# Patient Record
Sex: FEMALE | Race: BLACK OR AFRICAN AMERICAN | Employment: FULL TIME | URBAN - METROPOLITAN AREA
[De-identification: names, ages, dates, MRNs, and addresses within clinical notes are randomized per-mention and may not be internally consistent; named-entity substitution may affect disease eponyms.]

---

## 2017-03-27 ENCOUNTER — APPOINTMENT (OUTPATIENT)
Dept: GENERAL RADIOLOGY | Age: 62
End: 2017-03-27
Attending: EMERGENCY MEDICINE
Payer: COMMERCIAL

## 2017-03-27 ENCOUNTER — HOSPITAL ENCOUNTER (EMERGENCY)
Age: 62
Discharge: HOME | End: 2017-03-27
Attending: EMERGENCY MEDICINE | Admitting: EMERGENCY MEDICINE
Payer: COMMERCIAL

## 2017-03-27 VITALS
WEIGHT: 233 LBS | OXYGEN SATURATION: 97 % | TEMPERATURE: 98.3 F | DIASTOLIC BLOOD PRESSURE: 78 MMHG | HEART RATE: 70 BPM | RESPIRATION RATE: 18 BRPM | HEIGHT: 67 IN | SYSTOLIC BLOOD PRESSURE: 166 MMHG | BODY MASS INDEX: 36.57 KG/M2

## 2017-03-27 DIAGNOSIS — M54.10 RADICULOPATHY AFFECTING UPPER EXTREMITY: Primary | ICD-10-CM

## 2017-03-27 PROCEDURE — 99282 EMERGENCY DEPT VISIT SF MDM: CPT

## 2017-03-27 PROCEDURE — 73030 X-RAY EXAM OF SHOULDER: CPT

## 2017-03-27 RX ORDER — OXYCODONE AND ACETAMINOPHEN 5; 325 MG/1; MG/1
TABLET ORAL
Qty: 12 TAB | Refills: 0 | Status: SHIPPED | OUTPATIENT
Start: 2017-03-27

## 2017-03-27 RX ORDER — CYCLOBENZAPRINE HCL 10 MG
10 TABLET ORAL
Qty: 15 TAB | Refills: 0 | Status: SHIPPED | OUTPATIENT
Start: 2017-03-27

## 2017-03-27 NOTE — DISCHARGE INSTRUCTIONS
Pinched Nerve in the Neck: Care Instructions  Your Care Instructions  A pinched nerve in the neck happens when a vertebra or disc in the upper part of your spine is damaged. This damage can happen because of an injury. Or it can just happen with age. The changes caused by the damage may put pressure on a nearby nerve root, pinching it. This causes symptoms such as sharp pain in your neck, shoulder, arm, or back. You may also have tingling or numbness. Sometimes it makes your arm weaker. The symptoms are usually worse when you turn your head or strain your neck. For many people, the symptoms get better over time and finally go away. Early treatment usually includes medicines for pain and swelling. Sometimes physical therapy and special exercises may help. Follow-up care is a key part of your treatment and safety. Be sure to make and go to all appointments, and call your doctor if you are having problems. It's also a good idea to know your test results and keep a list of the medicines you take. How can you care for yourself at home? · Be safe with medicines. Read and follow all instructions on the label. ¨ If the doctor gave you a prescription medicine for pain, take it as prescribed. ¨ If you are not taking a prescription pain medicine, ask your doctor if you can take an over-the-counter medicine. · Try using a heating pad on a low or medium setting for 15 to 20 minutes every 2 or 3 hours. Try a warm shower in place of one session with the heating pad. You can also buy single-use heat wraps that last up to 8 hours. · You can also try an ice pack for 10 to 15 minutes every 2 to 3 hours. There isn't strong evidence that either heat or ice will help. But you can try them to see if they help you. · Don't spend too long in one position. Take short breaks to move around and change positions. · Wear a seat belt and shoulder harness when you are in a car.   · Sleep with a pillow under your head and neck that keeps your neck straight. · If you were given a neck brace (cervical collar) to limit neck motion, wear it as instructed for as many days as your doctor tells you to. Do not wear it longer than you were told to. Wearing a brace for too long can lead to neck stiffness and can weaken the neck muscles. · Follow your doctor's instructions for gentle neck-stretching exercises. · Do not smoke. Smoking can slow healing of your discs. If you need help quitting, talk to your doctor about stop-smoking programs and medicines. These can increase your chances of quitting for good. · Avoid strenuous work or exercise until your doctor says it is okay. When should you call for help? Call 911 anytime you think you may need emergency care. For example, call if:  · You are unable to move an arm or a leg at all. Call your doctor now or seek immediate medical care if:  · You have new or worse symptoms in your arms, legs, chest, belly, or buttocks. Symptoms may include:  ¨ Numbness or tingling. ¨ Weakness. ¨ Pain. · You lose bladder or bowel control. Watch closely for changes in your health, and be sure to contact your doctor if:  · You are not getting better as expected. Where can you learn more? Go to http://luis m-pedro.info/. Enter W379 in the search box to learn more about \"Pinched Nerve in the Neck: Care Instructions. \"  Current as of: May 23, 2016  Content Version: 11.1  © 7005-6417 [employer]. Care instructions adapted under license by [employer] (which disclaims liability or warranty for this information). If you have questions about a medical condition or this instruction, always ask your healthcare professional. [place] [address] any warranty or liability for your use of this information.

## 2017-03-27 NOTE — LETTER
[facility] EMERGENCY DEPT 
[address] [facility] [address] 
[phone] Work/School Note Date: 3/27/2017 To Whom It May concern: 
 
Kiana Irizarry was seen and treated today in the emergency room by the following provider(s): 
Attending Provider: Raul Quiroga MD.   
 
Doris North may return to work on 03/30/2017 Mckenzie Curran Sincerely, 
 
 
 
 
Raul Quiroga MD

## 2017-03-27 NOTE — ED PROVIDER NOTES
HPI Comments: 5:39 PM Meg Reyes is a 64 y.o. Female with a history of DM, heart murmur, and cervical fusion  presenting to the ED with R shoulder pain that began 4 days ago. SHe denies any recent injury or trauma, she was at a basketball game when the pain began. She states the pain is worse with movement and it radiates down the R arm to the finger tips causing a tingling in her finger tips. Pt denies neck pain. No other complaints at this time. Patient is a 64 y.o. female presenting with shoulder pain. The history is provided by the patient. Shoulder Pain           Past Medical History:   Diagnosis Date    Anemia     Arthritis     knees back     Arthritis     Bronchitis     Chest tightness     Chronic fatigue syndrome     Chronic pain     left knee    Colon polyps     Depression     Diabetes mellitus (HCC)     Elevated cholesterol     Heart disease     Heart murmur     Heart murmur     History of echocardiogram 04/17/2012    EF 60-65%. Mild conc LVH. RVSP 15-20. No significant valvular heart disease.     Hypercholesterolemia     Left knee pain     Lumbar radicular pain     Microscopic hematuria     Migraine headache     Milk intolerance     Palpitations     Renal cyst     YEE (stress urinary incontinence), male     Swelling     Syncope     Vitamin D deficiency        Past Surgical History:   Procedure Laterality Date    HX COLONOSCOPY  2/18/2014    Polyp (5mm) in the proximal sigmoid colon. (polypectomy)    HX COLONOSCOPY  04/07/2009    HX COLONOSCOPY  4/10/2006    HX HYSTERECTOMY  1989    HX KNEE ARTHROSCOPY      HX KNEE ARTHROSCOPY      HX KNEE REPLACEMENT  03/2013    left knee replacement    HX MENISCECTOMY      HX ORTHOPAEDIC      neck fusion  c4 c5    HX ORTHOPAEDIC      arthroscopy left knee    HX OTHER SURGICAL  2009    Colon polyp removed         Family History:   Problem Relation Age of Onset    Diabetes Mother     Heart Surgery Father    Nolia Stamp Cancer Father     Hypertension Sister     Diabetes Brother     High Cholesterol Sister     Diabetes Sister     Breast Cancer Sister     HIV/AIDS Brother     Breast Cancer Other     Ovarian Cancer Maternal Aunt        Social History     Social History    Marital status:      Spouse name: N/A    Number of children: N/A    Years of education: N/A     Occupational History    Not on file. Social History Main Topics    Smoking status: Former Smoker     Types: Cigarettes    Smokeless tobacco: Never Used      Comment: Quit in 1992, smoked 1 pack a week    Alcohol use No    Drug use: No    Sexual activity: Not Currently     Other Topics Concern    Not on file     Social History Narrative         ALLERGIES: Calcium iodide; Pneumovax 23 [pneumococcal 23-alisha ps vaccine]; and Shellfish containing products    Review of Systems   Constitutional: Negative for chills and fever. HENT: Negative for congestion and sneezing. Eyes: Negative for visual disturbance. Respiratory: Negative for cough and shortness of breath. Cardiovascular: Negative for chest pain. Gastrointestinal: Negative for abdominal pain, nausea and vomiting. Genitourinary: Negative for difficulty urinating and dysuria. Musculoskeletal: Positive for arthralgias (R shoulder pain). Negative for back pain. Skin: Negative for rash. Neurological: Negative for weakness and headaches. Vitals:    03/27/17 1713   BP: 166/78   Pulse: 70   Resp: 18   Temp: 98.3 °F (36.8 °C)   SpO2: 97%   Weight: 105.7 kg (233 lb)   Height: 5' 7\" (1.702 m)            Physical Exam   Constitutional: She is oriented to person, place, and time. She appears well-developed and well-nourished. No distress. HENT:   Head: Normocephalic and atraumatic.    Right Ear: External ear normal.   Left Ear: External ear normal.   Mouth/Throat: Oropharynx is clear and moist.   Eyes: Conjunctivae and EOM are normal. Pupils are equal, round, and reactive to light.   Neck: Normal range of motion. Neck supple. No JVD present. No tracheal deviation present. No thyromegaly present. Cardiovascular: Normal rate, regular rhythm and normal heart sounds. Exam reveals no gallop and no friction rub. No murmur heard. Pulmonary/Chest: Effort normal and breath sounds normal. No stridor. No respiratory distress. She has no wheezes. She has no rales. She exhibits no tenderness. Abdominal: Soft. Bowel sounds are normal. She exhibits no distension. There is no tenderness. There is no rebound. Musculoskeletal: Normal range of motion. She exhibits tenderness. She exhibits no edema. Right shoulder: She exhibits tenderness. She exhibits no swelling and no effusion. Mild TTP over the anterior shoulder area no joint effusion no swelling or skin changes    Lymphadenopathy:     She has no cervical adenopathy. Neurological: She is alert and oriented to person, place, and time. No cranial nerve deficit. Skin: Skin is warm. She is not diaphoretic. Psychiatric: She has a normal mood and affect. Her behavior is normal. Judgment and thought content normal.   Nursing note and vitals reviewed. MDM  Number of Diagnoses or Management Options  Radiculopathy affecting upper extremity:   Diagnosis management comments: Results reviewed with pt, she agrees with dispo and F/U plan. Dex Colorado MD  6:01 PM         Amount and/or Complexity of Data Reviewed  Tests in the radiology section of CPT®: ordered and reviewed      ED Course       Procedures  Vitals:  Patient Vitals for the past 12 hrs:   Temp Pulse Resp BP SpO2   03/27/17 1713 98.3 °F (36.8 °C) 70 18 166/78 97 %   [employer] interpreted within normal limits.        Medications ordered:   Medications - No data to display        X-Ray, CT or other radiology findings or impressions:  XR SHOULDER RT AP/LAT MIN 2 V    (Results Pending)       Progress notes, Consult notes or additional Procedure notes:   6:00 PM Pt reevaluated at this time and is resting comfortably in NAD. Discussed results and findings, as well as, diagnosis and plan for discharge. Pt verbalizes understanding and agreement with plan. All questions addressed at this time. Disposition:  Diagnosis:   1. Radiculopathy affecting upper extremity        Disposition: discharged    Follow-up Information     Follow up With Details [address], [place] In 1 week  [address] [facility] [address] [facility] [address] [facility]      [address] [facility] EMERGENCY DEPT  As needed, If symptoms worsen [address] [address]  [phone]           Patient's Medications   Start Taking    OXYCODONE-ACETAMINOPHEN (PERCOCET) 5-325 MG PER TABLET    Take 1 tablet every 4-6 hours as needed for pain control. If you were instructed to try over the counter ibuprofen or tylenol, only take the percocet for pain not controlled with the over the counter medication. Continue Taking    ASPIRIN 81 MG CHEWABLE TABLET    Take 81 mg by mouth daily. ATORVASTATIN (LIPITOR) 40 MG TABLET    Take  by mouth daily. CHOLECALCIFEROL, VITAMIN D3, 2,000 UNIT TAB    Take  by mouth. CYANOCOBALAMIN (VITAMIN B12) 500 MCG TABLET    Take 500 mcg by mouth daily. ESOMEPRAZOLE (NEXIUM) 40 MG CAPSULE        GABAPENTIN (NEURONTIN) 300 MG CAPSULE    Take 300 mg by mouth three (3) times daily. LOSARTAN (COZAAR) 100 MG TABLET    Take 100 mg by mouth daily. METOCLOPRAMIDE HCL (REGLAN) 5 MG TABLET    Take 5 mg by mouth Before breakfast, lunch, and dinner. MOMETASONE (NASONEX) 50 MCG/ACTUATION NASAL SPRAY    2 Sprays daily. MONTELUKAST (SINGULAIR) 10 MG TABLET    Take 10 mg by mouth daily. PAROXETINE (PAXIL) 20 MG TABLET    Take  by mouth daily. TOPIRAMATE (TOPAMAX) 50 MG TABLET    Take  by mouth two (2) times a day. VITAMIN E (E GEMS) 1,000 UNIT CAPSULE    Take 1,000 Units by mouth daily.    These Medications have changed    No medications on file   Stop Taking    DIAZEPAM (VALIUM) 2 MG TABLET    Take 2.5 Tabs by mouth two (2) times daily as needed for Anxiety. Max Daily Amount: 10 mg. DICLOFENAC EC (VOLTAREN) 75 MG EC TABLET    Take 1 Tab by mouth two (2) times a day. NEBIVOLOL (BYSTOLIC) 5 MG TABLET    Take  by mouth daily. SITAGLIPTIN (JANUVIA) 100 MG TABLET    Take 100 mg by mouth daily. SCRIBE ATTESTATION STATEMENT  Documented by: Chance Yan scribing for, and in the presence of, Tamia Grewal MD 5:44 PM     PROVIDER ATTESTATION STATEMENT  I personally performed the services described in the documentation, reviewed the documentation, as recorded by the scribe in my presence, and it accurately and completely records my words and actions.   Tamia Grewal MD

## 2017-03-27 NOTE — ED TRIAGE NOTES
Patient states:  \"I think I have a pinched nerve to my right shoulder\". Patient states pain to right shoulder radiating downward into arm x 3 days. Denies any known injury.

## 2017-04-05 ENCOUNTER — OFFICE VISIT (OUTPATIENT)
Dept: ORTHOPEDIC SURGERY | Age: 62
End: 2017-04-05

## 2017-04-05 VITALS
SYSTOLIC BLOOD PRESSURE: 164 MMHG | HEART RATE: 87 BPM | RESPIRATION RATE: 18 BRPM | BODY MASS INDEX: 36.26 KG/M2 | HEIGHT: 67 IN | TEMPERATURE: 98.2 F | DIASTOLIC BLOOD PRESSURE: 80 MMHG | WEIGHT: 231 LBS | OXYGEN SATURATION: 97 %

## 2017-04-05 DIAGNOSIS — M79.18 MYOFASCIAL PAIN: ICD-10-CM

## 2017-04-05 DIAGNOSIS — M54.2 NECK PAIN: ICD-10-CM

## 2017-04-05 DIAGNOSIS — M54.12 CERVICAL RADICULOPATHY: Primary | ICD-10-CM

## 2017-04-05 NOTE — LETTER
4/5/2017 3:54 PM 
 
Ms. Anson LONGORIA Blanco [address] [place] [id number] To Whom It May Concern: 
 
Kiana Irizarry is currently under the care of [address] [place] [address]. She was seen today on 4/5/2017. Ms. Tamara Rudolph will stay out of work for the next two weeks. Her condition will be reevaluated after this period. If there are questions or concerns please have the patient contact our office.  
 
 
 
Sincerely, 
 
 
Anna Wolfe MD

## 2017-04-05 NOTE — PATIENT INSTRUCTIONS
Pinched Nerve in the Neck: Care Instructions  Your Care Instructions  A pinched nerve in the neck happens when a vertebra or disc in the upper part of your spine is damaged. This damage can happen because of an injury. Or it can just happen with age. The changes caused by the damage may put pressure on a nearby nerve root, pinching it. This causes symptoms such as sharp pain in your neck, shoulder, arm, or back. You may also have tingling or numbness. Sometimes it makes your arm weaker. The symptoms are usually worse when you turn your head or strain your neck. For many people, the symptoms get better over time and finally go away. Early treatment usually includes medicines for pain and swelling. Sometimes physical therapy and special exercises may help. Follow-up care is a key part of your treatment and safety. Be sure to make and go to all appointments, and call your doctor if you are having problems. It's also a good idea to know your test results and keep a list of the medicines you take. How can you care for yourself at home? · Be safe with medicines. Read and follow all instructions on the label. ¨ If the doctor gave you a prescription medicine for pain, take it as prescribed. ¨ If you are not taking a prescription pain medicine, ask your doctor if you can take an over-the-counter medicine. · Try using a heating pad on a low or medium setting for 15 to 20 minutes every 2 or 3 hours. Try a warm shower in place of one session with the heating pad. You can also buy single-use heat wraps that last up to 8 hours. · You can also try an ice pack for 10 to 15 minutes every 2 to 3 hours. There isn't strong evidence that either heat or ice will help. But you can try them to see if they help you. · Don't spend too long in one position. Take short breaks to move around and change positions. · Wear a seat belt and shoulder harness when you are in a car.   · Sleep with a pillow under your head and neck that keeps your neck straight. · If you were given a neck brace (cervical collar) to limit neck motion, wear it as instructed for as many days as your doctor tells you to. Do not wear it longer than you were told to. Wearing a brace for too long can lead to neck stiffness and can weaken the neck muscles. · Follow your doctor's instructions for gentle neck-stretching exercises. · Do not smoke. Smoking can slow healing of your discs. If you need help quitting, talk to your doctor about stop-smoking programs and medicines. These can increase your chances of quitting for good. · Avoid strenuous work or exercise until your doctor says it is okay. When should you call for help? Call 911 anytime you think you may need emergency care. For example, call if:  · You are unable to move an arm or a leg at all. Call your doctor now or seek immediate medical care if:  · You have new or worse symptoms in your arms, legs, chest, belly, or buttocks. Symptoms may include:  ¨ Numbness or tingling. ¨ Weakness. ¨ Pain. · You lose bladder or bowel control. Watch closely for changes in your health, and be sure to contact your doctor if:  · You are not getting better as expected. Where can you learn more? Go to http://luis m-pedro.info/. Enter U412 in the search box to learn more about \"Pinched Nerve in the Neck: Care Instructions. \"  Current as of: May 23, 2016  Content Version: 11.2  © 4028-0812 [employer]. Care instructions adapted under license by [employer] (which disclaims liability or warranty for this information). If you have questions about a medical condition or this instruction, always ask your healthcare professional. [place] [address] any warranty or liability for your use of this information.

## 2017-04-05 NOTE — MR AVS SNAPSHOT
Visit Information Date & Time Provider Department Dept. Phone Encounter #  
 4/5/2017  2:45 PM Eduardo Vidal MD [facility] [phone] [id number] Your Appointments 4/28/2017 10:30 AM  
Follow Up with Eduardo Vidal MD  
[address] [facility] [facility]) Appt Note: mri fu  
 [address] [facility] [address] [phone]  
  
   
 [address] [facility] [address] Upcoming Health Maintenance Date Due Hepatitis C Screening 1955 DTaP/Tdap/Td series (1 - Tdap) 7/19/1976 PAP AKA CERVICAL CYTOLOGY 7/19/1976 FOBT Q 1 YEAR AGE 50-75 7/19/2005 ZOSTER VACCINE AGE 60> 7/19/2015 INFLUENZA AGE 9 TO ADULT 8/1/2016 BREAST CANCER SCRN MAMMOGRAM 10/27/2016 Allergies as of 4/5/2017  Review Complete On: 4/5/2017 By: Humaira Red Severity Noted Reaction Type Reactions Calcium Iodide  03/29/2016    Unproven on Challenge Pt. denies Pneumovax 23 [Pneumococcal 23-alisha Ps Vaccine]    Unknown (comments) Shellfish Containing Products  04/06/2012    Swelling Current Immunizations  Never Reviewed Name Date Pneumococcal Polysaccharide (PPSV-23) 3/13/2013  5:42 PM  
  
 Not reviewed this visit You Were Diagnosed With   
  
 Codes Comments Cervical radiculopathy    -  Primary ICD-10-CM: M54.12 
ICD-9-CM: 723.4 Myofascial pain     ICD-10-CM: M79.1 ICD-9-CM: 729.1 Neck pain     ICD-10-CM: M54.2 ICD-9-CM: 723.1 Vitals BP Pulse Temp Resp Height(growth percentile) Weight(growth percentile) 164/80 87 98.2 °F (36.8 °C) (Oral) 18 5' 7\" (1.702 m) 231 lb (104.8 kg) SpO2 BMI OB Status Smoking Status 97% 36.18 kg/m2 Postmenopausal Former Smoker BMI and BSA Data Body Mass Index Body Surface Area  
 36.18 kg/m 2 2.23 m 2 Preferred Pharmacy Pharmacy Name Phone Imelda Beasleyjhon [address] [facility] [place] [address], [address] [address] [phone] Your Updated Medication List  
  
   
This list is accurate as of: 4/5/17  4:05 PM.  Always use your most recent med list.  
  
  
  
  
 aspirin 81 mg chewable tablet Take 81 mg by mouth daily. atorvastatin 40 mg tablet Commonly known as:  LIPITOR Take  by mouth daily. cholecalciferol (vitamin D3) 2,000 unit Tab Take  by mouth.  
  
 cyanocobalamin 500 mcg tablet Commonly known as:  VITAMIN B12 Take 500 mcg by mouth daily. cyclobenzaprine 10 mg tablet Commonly known as:  FLEXERIL Take 1 Tab by mouth three (3) times daily as needed for Muscle Spasm(s). esomeprazole 40 mg capsule Commonly known as:  NEXIUM  
  
 gabapentin 300 mg capsule Commonly known as:  NEURONTIN Take 300 mg by mouth three (3) times daily. losartan 100 mg tablet Commonly known as:  COZAAR Take 100 mg by mouth daily. metoclopramide HCl 5 mg tablet Commonly known as:  REGLAN Take 5 mg by mouth Before breakfast, lunch, and dinner. montelukast 10 mg tablet Commonly known as:  SINGULAIR Take 10 mg by mouth daily. NASONEX 50 mcg/actuation nasal spray Generic drug:  mometasone 2 Sprays daily. oxyCODONE-acetaminophen 5-325 mg per tablet Commonly known as:  PERCOCET Take 1 tablet every 4-6 hours as needed for pain control. If you were instructed to try over the counter ibuprofen or tylenol, only take the percocet for pain not controlled with the over the counter medication. PAXIL 20 mg tablet Generic drug:  PARoxetine Take  by mouth daily. TOPAMAX 50 mg tablet Generic drug:  topiramate Take  by mouth two (2) times a day. vitamin e 1,000 unit capsule Commonly known as:  E GEMS Take 1,000 Units by mouth daily. We Performed the Following AMB POC XRAY, SPINE, CERVICAL; 2 OR 3 [64266 CPT(R)] To-Do List   
 04/12/2017 Imaging:  MRI CERV SPINE WO CONT Referral Information Referral ID Referred By Referred To  
  
 [id number] Shabana Cao Not Available Visits Status Start Date End Date 1 New Request 4/5/17 4/5/18 If your referral has a status of pending review or denied, additional information will be sent to support the outcome of this decision. Patient Instructions Pinched Nerve in the Neck: Care Instructions Your Care Instructions A pinched nerve in the neck happens when a vertebra or disc in the upper part of your spine is damaged. This damage can happen because of an injury. Or it can just happen with age. The changes caused by the damage may put pressure on a nearby nerve root, pinching it. This causes symptoms such as sharp pain in your neck, shoulder, arm, or back. You may also have tingling or numbness. Sometimes it makes your arm weaker. The symptoms are usually worse when you turn your head or strain your neck. For many people, the symptoms get better over time and finally go away. Early treatment usually includes medicines for pain and swelling. Sometimes physical therapy and special exercises may help. Follow-up care is a key part of your treatment and safety. Be sure to make and go to all appointments, and call your doctor if you are having problems. It's also a good idea to know your test results and keep a list of the medicines you take. How can you care for yourself at home? · Be safe with medicines. Read and follow all instructions on the label. ¨ If the doctor gave you a prescription medicine for pain, take it as prescribed. ¨ If you are not taking a prescription pain medicine, ask your doctor if you can take an over-the-counter medicine. · Try using a heating pad on a low or medium setting for 15 to 20 minutes every 2 or 3 hours. Try a warm shower in place of one session with the heating pad. You can also buy single-use heat wraps that last up to 8 hours. · You can also try an ice pack for 10 to 15 minutes every 2 to 3 hours. There isn't strong evidence that either heat or ice will help. But you can try them to see if they help you. · Don't spend too long in one position. Take short breaks to move around and change positions. · Wear a seat belt and shoulder harness when you are in a car. · Sleep with a pillow under your head and neck that keeps your neck straight. · If you were given a neck brace (cervical collar) to limit neck motion, wear it as instructed for as many days as your doctor tells you to. Do not wear it longer than you were told to. Wearing a brace for too long can lead to neck stiffness and can weaken the neck muscles. · Follow your doctor's instructions for gentle neck-stretching exercises. · Do not smoke. Smoking can slow healing of your discs. If you need help quitting, talk to your doctor about stop-smoking programs and medicines. These can increase your chances of quitting for good. · Avoid strenuous work or exercise until your doctor says it is okay. When should you call for help? Call 911 anytime you think you may need emergency care. For example, call if: 
· You are unable to move an arm or a leg at all. Call your doctor now or seek immediate medical care if: 
· You have new or worse symptoms in your arms, legs, chest, belly, or buttocks. Symptoms may include: ¨ Numbness or tingling. ¨ Weakness. ¨ Pain. · You lose bladder or bowel control. Watch closely for changes in your health, and be sure to contact your doctor if: 
· You are not getting better as expected. Where can you learn more? Go to http://luis m-pedro.info/. Enter A582 in the search box to learn more about \"Pinched Nerve in the Neck: Care Instructions. \" Current as of: May 23, 2016 Content Version: 11.2 © 5112-5848 [employer].  Care instructions adapted under license by [employer] (which disclaims liability or warranty for this information). If you have questions about a medical condition or this instruction, always ask your healthcare professional. [address] any warranty or liability for your use of this information. Introducing [facility]! Dear Shana Villarreal: Thank you for requesting a Argus account. Our records indicate that you already have an active Argus account. You can access your account anytime at [email or web address] Did you know that you can access your hospital and ER discharge instructions at any time in Argus? You can also review all of your test results from your hospital stay or ER visit. Additional Information If you have questions, please visit the Frequently Asked Questions section of the Argus website at [email or web address] Remember, Argus is NOT to be used for urgent needs. For medical emergencies, dial 911. Now available from your iPhone and Android! Please provide this summary of care documentation to your next provider. Your primary care clinician is listed as Margarita Walsh. If you have any questions after today's visit, please call [phone].

## 2017-04-05 NOTE — LETTER
NOTIFICATION OF RETURN TO WORK 
 
4/5/2017 4:08 PM 
 
Ms. Pearl LONGORIA Blanco [address] [place] [id number] [facility] To Whom It May Concern: 
 
Kiana BARBER Irizarry was under the care of [facility] She was seen today on 4/5/2017. Ms. Jazmine Figueroa will stay out of work for the next two weeks until re-evaluation. Her condition will be reevaluated at her scheduled appointment on 4/28/17. If there are questions or concerns please have the patient contact our office.  
 
Sincerely, 
 
 
Crystal Gutierrez MD

## 2017-04-05 NOTE — PROGRESS NOTES
[address]  [address], [address]  [place]  Phone: [phone]  Fax: [phone]        Reid Oliver  : 1955  PCP: Libertad Ferguson MD  2017    PROGRESS NOTE      ASSESSMENT AND PLAN    Kiana Irizarry comes in to the office today for a prn f/u. She was previously seen for lumbar pain but returns today because of a new cervical pain that radiates down her right arm into the the first and second digits of her hand. She feels the numbness more in her palmar side. Pt recently completed a steroid pack which provided great relief for her symptoms. Her pain/symptoms are likely due to a cervical radiculopathy. There is also a component of myofascial pain. She was referred for a cervical MRI. Pt was given a work note to stay out for the next two weeks. Pt will f/u in 2 weeks or prn. Karolyn Claude was seen today for neck pain and follow-up. Diagnoses and all orders for this visit:    Cervical radiculopathy  -     MRI CERV SPINE WO CONT; Future    Myofascial pain    Neck pain  -     [31556] C Spine 2-3V  -     MRI CERV SPINE WO CONT; Future         Follow-up Disposition: Not on File      HISTORY OF PRESENT ILLNESS  Kianaestuardo Boyle is a 64 y.o. female. A&P / HPI from 2016:  Opal Young was in the office for a lumbar MRI f/u. Her pain is likely due to facet syndrome and myofascial pain. Pt was informed about the theracane. She will be referred to pain management to be evaluated for median branch neurotomy. The risks, benefits, and potential side effects of this procedure were discussed.      Pt was prescribed diclofenac 75mg BID prn. The risks, benefits, and potential side effects of this medication were discussed.      PT for potential pelvic floor dysfunction was discussed but she said she would like to wait.     She will f/u in 3 months, or prn. Updates from 17:  Pt presents for a prn f/u.  She was previously seen for lumbar pain but returns today because of a new cervical pain that radiates down her right arm into the the first and second digits of her hand. She feels the numbness more in her palmar side. Pt recently completed a steroid pack which provided great relief for her symptoms. Her pain/symptoms are likely due to a cervical radiculopathy. There is also a component of myofascial pain. She reminds me she has had a C4-5 fusion. PAST MEDICAL HISTORY   Past Medical History:   Diagnosis Date    Anemia     Arthritis     knees back     Arthritis     Bronchitis     Chest tightness     Chronic fatigue syndrome     Chronic pain     left knee    Colon polyps     Depression     Diabetes mellitus (HCC)     Elevated cholesterol     Heart disease     Heart murmur     Heart murmur     History of echocardiogram 04/17/2012    EF 60-65%. Mild conc LVH. RVSP 15-20. No significant valvular heart disease.  Hypercholesterolemia     Left knee pain     Lumbar radicular pain     Microscopic hematuria     Migraine headache     Milk intolerance     Palpitations     Renal cyst     YEE (stress urinary incontinence), male     Swelling     Syncope     Vitamin D deficiency        Past Surgical History:   Procedure Laterality Date    HX COLONOSCOPY  2/18/2014    Polyp (5mm) in the proximal sigmoid colon. (polypectomy)    HX COLONOSCOPY  04/07/2009    HX COLONOSCOPY  4/10/2006    HX HYSTERECTOMY  1989    HX KNEE ARTHROSCOPY      HX KNEE ARTHROSCOPY      HX KNEE REPLACEMENT  03/2013    left knee replacement    HX MENISCECTOMY      HX ORTHOPAEDIC      neck fusion  c4 c5    HX ORTHOPAEDIC      arthroscopy left knee    HX OTHER SURGICAL  2009    Colon polyp removed   . MEDICATIONS      Current Outpatient Prescriptions   Medication Sig Dispense Refill    cyclobenzaprine (FLEXERIL) 10 mg tablet Take 1 Tab by mouth three (3) times daily as needed for Muscle Spasm(s).  15 Tab 0    aspirin 81 mg chewable tablet Take 81 mg by mouth daily.  vitamin e (E GEMS) 1,000 unit capsule Take 1,000 Units by mouth daily.  esomeprazole (NEXIUM) 40 mg capsule       cholecalciferol, vitamin D3, 2,000 unit tab Take  by mouth.  cyanocobalamin (VITAMIN B12) 500 mcg tablet Take 500 mcg by mouth daily.  atorvastatin (LIPITOR) 40 mg tablet Take  by mouth daily.  gabapentin (NEURONTIN) 300 mg capsule Take 300 mg by mouth three (3) times daily.  losartan (COZAAR) 100 mg tablet Take 100 mg by mouth daily.  mometasone (NASONEX) 50 mcg/actuation nasal spray 2 Sprays daily.  PARoxetine (PAXIL) 20 mg tablet Take  by mouth daily.  topiramate (TOPAMAX) 50 mg tablet Take  by mouth two (2) times a day.  oxyCODONE-acetaminophen (PERCOCET) 5-325 mg per tablet Take 1 tablet every 4-6 hours as needed for pain control. If you were instructed to try over the counter ibuprofen or tylenol, only take the percocet for pain not controlled with the over the counter medication. 12 Tab 0    montelukast (SINGULAIR) 10 mg tablet Take 10 mg by mouth daily.  metoclopramide HCl (REGLAN) 5 mg tablet Take 5 mg by mouth Before breakfast, lunch, and dinner.           ALLERGIES    Allergies   Allergen Reactions    Calcium Iodide Unproven on Challenge     Pt. denies    Pneumovax 23 [Pneumococcal 23-Amita Ps Vaccine] Unknown (comments)    Shellfish Containing Products Swelling          SOCIAL HISTORY    Social History     Social History    Marital status:      Spouse name: N/A    Number of children: N/A    Years of education: N/A     Social History Main Topics    Smoking status: Former Smoker     Types: Cigarettes    Smokeless tobacco: Never Used      Comment: Quit in 1992, smoked 1 pack a week    Alcohol use No    Drug use: No    Sexual activity: Not Currently     Other Topics Concern    Not on file     Social History Narrative     Social History Narrative      Problem Relation Age of Onset    Diabetes Mother     Heart Surgery Father     Cancer Father     Hypertension Sister     Diabetes Brother     High Cholesterol Sister     Diabetes Sister     Breast Cancer Sister     HIV/AIDS Brother     Breast Cancer Other     Ovarian Cancer Maternal Aunt          REVIEW OF SYSTEMS  Review of Systems   Constitutional: Negative for chills, diaphoresis, fever, malaise/fatigue and weight loss. Respiratory: Negative for shortness of breath. Cardiovascular: Negative for chest pain and leg swelling. Gastrointestinal: Negative for constipation, nausea and vomiting. Neurological: Negative for dizziness, tingling, seizures, loss of consciousness and headaches. Psychiatric/Behavioral: The patient does not have insomnia. PHYSICAL EXAMINATION  Visit Vitals    /80    Pulse 87    Temp 98.2 °F (36.8 °C) (Oral)    Resp 18    Ht 5' 7\" (1.702 m)    Wt 231 lb (104.8 kg)    SpO2 97%    BMI 36.18 kg/m2       No flowsheet data found. Constitutional:  Well developed, well nourished, in no acute distress. Psychiatric: Affect and mood are appropriate. Integumentary: No rashes or abrasions noted on exposed areas. SPINE/MUSCULOSKELETAL EXAM    Cervical spine:  Neck is midline.    Normal muscle tone.    No focal atrophy is noted.    ROM pain free.    Shoulder ROM intact.    Mild tenderness to palpation.    Negative Spurling's sign.    Negative Tinel's sign.    Negative De Leon's sign.       Sensation in the bilateral arms grossly intact to light touch.       Lumbar spine:  No rash, ecchymosis, or gross obliquity.    No fasciculations.    No focal atrophy is noted.    No pain with hip ROM.    Range of motion is normal.    Diffuse Tenderness to palpation, lumbar paraspinal muscles.    No tenderness to palpation at the sciatic notch.    SI joints non-tender.    Trochanters non tender.      Sensation in the bilateral legs grossly intact to light touch.     Updates from 04/05/17:  Tenderness to palpation R>L. MOTOR:      Biceps  Triceps Deltoids Wrist Ext Wrist Flex Hand Intrin   Right 5/5 5/5 5/5 5/5 5/5 5/5   Left 5/5 5/5 5/5 5/5 5/5 5/5             Hip Flex  Quads Hamstrings Ankle DF EHL Ankle PF   Right 5/5 5/5 5/5 5/5 5/5 5/5   Left 5/5 5/5 5/5 5/5 5/5 5/5     DTRs are 2+ biceps, triceps, brachioradialis, patella, and Achilles.     Negative Straight Leg raise. Squat not tested. No difficulty with tandem gait. Normal heel walk. Normal toe rise.      Ambulation without assistive device. FWB.       RADIOGRAPHS  Lumbar MRI films from 4/5/2016 personally reviewed with patient:  1. Similar appearing degenerative disc disease L5/S1. 2. Mild multilevel facet arthrosis. No central canal stenosis. No high-grade  foraminal stenosis.      reviewed     This plan was reviewed with the patient and patient agrees. All questions were answered. More than half of this visit today was spent on counseling. Written by Fransico Black, as dictated by Dr. Dk Diaz. I, Dr. Dk Diaz, confirm that all documentation is accurate.

## 2017-04-06 ENCOUNTER — TELEPHONE (OUTPATIENT)
Dept: ORTHOPEDIC SURGERY | Age: 62
End: 2017-04-06

## 2017-04-06 NOTE — TELEPHONE ENCOUNTER
Patient called back and I asked her what date she needed on the note, she stated that she was to see Dr. Milly Lesch on the 28 th of April and for further evaluation but needed the spacific date on the note not I will see her back in 2 weeks.  Please Advise if another note can be written

## 2017-04-06 NOTE — TELEPHONE ENCOUNTER
I called the patient to ask what date she needs to be put in the work note as her returning to work. Then I can give to him to approve the work note.  Waiting for patient to return my call

## 2017-04-06 NOTE — TELEPHONE ENCOUNTER
PATIENT CALLED FOR  . PATIENT SAID SHE NEEDS A NEW LETTER THAT WOULD STATE THE SPECIFIC DATE WHEN SHE WILL BE RETURNING TO WORK. THE LETTER SHE HAS NOW JUST SAYS TWO WEEKS BUT DOES NOT STATE THE SPECIFIC DATE. WILL  FROM [facility] LOCATION. PATIENT TEL. [phone]. NOTE: PATIENT NEXT APPOINTMENT WITH  IS ON 04/28/2017.

## 2017-04-10 ENCOUNTER — TELEPHONE (OUTPATIENT)
Dept: ORTHOPEDIC SURGERY | Age: 62
End: 2017-04-10

## 2017-04-10 NOTE — TELEPHONE ENCOUNTER
[employer] form , Completed, waiting on Dr. Dacia Clemente to return next week to sign, then to [place] to process.

## 2017-04-17 ENCOUNTER — HOSPITAL ENCOUNTER (OUTPATIENT)
Age: 62
Discharge: HOME | End: 2017-04-17
Attending: PHYSICAL MEDICINE & REHABILITATION
Payer: COMMERCIAL

## 2017-04-17 DIAGNOSIS — M54.2 NECK PAIN: ICD-10-CM

## 2017-04-17 DIAGNOSIS — M54.12 CERVICAL RADICULOPATHY: ICD-10-CM

## 2017-04-17 PROCEDURE — 72141 MRI NECK SPINE W/O DYE: CPT

## 2017-04-18 ENCOUNTER — DOCUMENTATION ONLY (OUTPATIENT)
Dept: ORTHOPEDIC SURGERY | Age: 62
End: 2017-04-18

## 2017-04-26 ENCOUNTER — DOCUMENTATION ONLY (OUTPATIENT)
Dept: ORTHOPEDIC SURGERY | Age: 62
End: 2017-04-26

## 2017-04-26 NOTE — PROGRESS NOTES
4/26/17 patient came to the [facility] office and left [employer] form to be completed. She was told it could be 7-10 business days. She paid her $20 fee. She would like it faxed to the [employer] at [phone]. The fax # is not on the form, I explained we may not be able to fax due to [employer]. Patient would like to  when done. Call her at [phone].

## 2017-04-28 ENCOUNTER — OFFICE VISIT (OUTPATIENT)
Dept: ORTHOPEDIC SURGERY | Age: 62
End: 2017-04-28

## 2017-04-28 ENCOUNTER — TELEPHONE (OUTPATIENT)
Dept: ORTHOPEDIC SURGERY | Age: 62
End: 2017-04-28

## 2017-04-28 VITALS
HEIGHT: 67 IN | TEMPERATURE: 98.1 F | BODY MASS INDEX: 36.54 KG/M2 | OXYGEN SATURATION: 97 % | HEART RATE: 80 BPM | SYSTOLIC BLOOD PRESSURE: 144 MMHG | DIASTOLIC BLOOD PRESSURE: 75 MMHG | RESPIRATION RATE: 18 BRPM | WEIGHT: 232.8 LBS

## 2017-04-28 DIAGNOSIS — M48.02 CERVICAL STENOSIS OF SPINE: Primary | ICD-10-CM

## 2017-04-28 RX ORDER — OMEPRAZOLE 10 MG/1
20 CAPSULE, DELAYED RELEASE ORAL
COMMUNITY
Start: 2017-02-24 | End: 2018-07-17 | Stop reason: SDUPTHER

## 2017-04-28 RX ORDER — GABAPENTIN 300 MG/1
300 CAPSULE ORAL 3 TIMES DAILY
Qty: 90 CAP | Refills: 2 | Status: SHIPPED | OUTPATIENT
Start: 2017-04-28 | End: 2018-07-17 | Stop reason: SDUPTHER

## 2017-04-28 NOTE — PROGRESS NOTES
[address]  [place], [address] [place] [address] Street  Phone: [phone]  Fax: [phone]        Javy Overton  : 1955  PCP: Julia Lwason MD  2017    PROGRESS NOTE      ASSESSMENT AND PLAN    Kiana Irizarry comes in to the office today for a cervical MRI f/u. The MRI shows moderate to severe central stenosis at C6-7 with moderately severe bilateral foraminal stenosis at the same level. Pt continues to have radiating right arm pain. We discussed having an EMG for further evaluation or proceeding with cervical interlaminar injections. Pt would like to have the EMG first. She was prescribed an increased dose of Gabapentin (300 mg TID from 300 mg BID). Pt will f/u in 5 weeks or prn. Amarjit Molina was seen today for back pain. Diagnoses and all orders for this visit:    Cervical stenosis of spine  -     gabapentin (NEURONTIN) 300 mg capsule; Take 1 Cap by mouth three (3) times daily. -     EMG TWO EXTREMITIES UPPER; Future       Follow-up Disposition: Not on File      HISTORY OF PRESENT ILLNESS  Kianaestuardo Jeter is a 64 y.o. female. A&P / HPI from 2016:  Claus Wang was in the office for a lumbar MRI f/u. Her pain is likely due to facet syndrome and myofascial pain. Pt was informed about the theracane. She will be referred to pain management to be evaluated for median branch neurotomy. The risks, benefits, and potential side effects of this procedure were discussed.       Pt was prescribed diclofenac 75mg BID prn. The risks, benefits, and potential side effects of this medication were discussed.       PT for potential pelvic floor dysfunction was discussed but she said she would like to wait.      She will f/u in 3 months, or prn.      Updates from 17:  Pt presents for a prn f/u.  She was previously seen for lumbar pain but returns today because of a new cervical pain that radiates down her right arm into the the first and second digits of her hand. She feels the numbness more in her palmar side. Pt recently completed a steroid pack which provided great relief for her symptoms. Her pain/symptoms are likely due to a cervical radiculopathy. There is also a component of myofascial pain.     She reminds me she has had a C4-5 fusion. Updates from 04/28/17:  Pt presents for a cervical MRI f/u. The MRI shows moderate to severe central stenosis at C6-7 with moderately severe bilateral foraminal stenosis at the same level. Pt continues to have radiating right arm pain. PAST MEDICAL HISTORY   Past Medical History:   Diagnosis Date    Anemia     Arthritis     knees back     Arthritis     Bronchitis     Chest tightness     Chronic fatigue syndrome     Chronic pain     left knee    Colon polyps     Depression     Diabetes mellitus (HCC)     Elevated cholesterol     Heart disease     Heart murmur     Heart murmur     History of echocardiogram 04/17/2012    EF 60-65%. Mild conc LVH. RVSP 15-20. No significant valvular heart disease.  Hypercholesterolemia     Left knee pain     Lumbar radicular pain     Microscopic hematuria     Migraine headache     Milk intolerance     Palpitations     Renal cyst     YEE (stress urinary incontinence), male     Swelling     Syncope     Vitamin D deficiency        Past Surgical History:   Procedure Laterality Date    HX COLONOSCOPY  2/18/2014    Polyp (5mm) in the proximal sigmoid colon. (polypectomy)    HX COLONOSCOPY  04/07/2009    HX COLONOSCOPY  4/10/2006    HX HYSTERECTOMY  1989    HX KNEE ARTHROSCOPY      HX KNEE ARTHROSCOPY      HX KNEE REPLACEMENT  03/2013    left knee replacement    HX MENISCECTOMY      HX ORTHOPAEDIC      neck fusion  c4 c5    HX ORTHOPAEDIC      arthroscopy left knee    HX OTHER SURGICAL  2009    Colon polyp removed   .       MEDICATIONS      Current Outpatient Prescriptions   Medication Sig Dispense Refill    omeprazole (PRILOSEC) 10 mg capsule 20 mg.      aspirin 81 mg chewable tablet Take 81 mg by mouth daily.  vitamin e (E GEMS) 1,000 unit capsule Take 1,000 Units by mouth daily.  esomeprazole (NEXIUM) 40 mg capsule       cholecalciferol, vitamin D3, 2,000 unit tab Take  by mouth.  cyanocobalamin (VITAMIN B12) 500 mcg tablet Take 500 mcg by mouth daily.  montelukast (SINGULAIR) 10 mg tablet Take 10 mg by mouth daily.  atorvastatin (LIPITOR) 40 mg tablet Take  by mouth daily.  gabapentin (NEURONTIN) 300 mg capsule Take 300 mg by mouth three (3) times daily.  losartan (COZAAR) 100 mg tablet Take 100 mg by mouth daily.  metoclopramide HCl (REGLAN) 5 mg tablet Take 5 mg by mouth Before breakfast, lunch, and dinner.  mometasone (NASONEX) 50 mcg/actuation nasal spray 2 Sprays daily.  PARoxetine (PAXIL) 20 mg tablet Take  by mouth daily.  topiramate (TOPAMAX) 50 mg tablet Take  by mouth two (2) times a day.  oxyCODONE-acetaminophen (PERCOCET) 5-325 mg per tablet Take 1 tablet every 4-6 hours as needed for pain control. If you were instructed to try over the counter ibuprofen or tylenol, only take the percocet for pain not controlled with the over the counter medication. 12 Tab 0    cyclobenzaprine (FLEXERIL) 10 mg tablet Take 1 Tab by mouth three (3) times daily as needed for Muscle Spasm(s).  15 Tab 0        ALLERGIES    Allergies   Allergen Reactions    Calcium Iodide Unproven on Challenge     Pt. denies    Pneumovax 23 [Pneumococcal 23-Amita Ps Vaccine] Unknown (comments)    Shellfish Containing Products Swelling          SOCIAL HISTORY    Social History     Social History    Marital status:      Spouse name: N/A    Number of children: N/A    Years of education: N/A     Social History Main Topics    Smoking status: Former Smoker     Types: Cigarettes     Quit date: 1992    Smokeless tobacco: Never Used      Comment: Quit in 1992, smoked 1 pack a week    Alcohol use No    Drug use: No    Sexual activity: Not Currently     Other Topics Concern    Not on file     Social History Narrative     Social History Narrative      Problem Relation Age of Onset    Diabetes Mother     Heart Surgery Father     Cancer Father     Hypertension Sister     Diabetes Brother     High Cholesterol Sister     Diabetes Sister     Breast Cancer Sister     HIV/AIDS Brother     Breast Cancer Other     Ovarian Cancer Maternal Aunt          REVIEW OF SYSTEMS  Review of Systems   Constitutional: Negative for chills, diaphoresis, fever, malaise/fatigue and weight loss. Respiratory: Negative for shortness of breath. Cardiovascular: Negative for chest pain and leg swelling. Gastrointestinal: Negative for constipation, nausea and vomiting. Neurological: Negative for dizziness, tingling, seizures, loss of consciousness and headaches. Psychiatric/Behavioral: The patient does not have insomnia. PHYSICAL EXAMINATION  Visit Vitals    /75    Pulse 80    Temp 98.1 °F (36.7 °C) (Oral)    Resp 18    Ht 5' 7\" (1.702 m)    Wt 232 lb 12.8 oz (105.6 kg)    SpO2 97%    BMI 36.46 kg/m2       Pain Assessment  4/28/2017   Location of Pain Neck;Back;Finger   Location Modifiers Right   Severity of Pain 3   Quality of Pain Aching; Other (Comment)   Quality of Pain Comment tingling to right hand/fingers   Duration of Pain Persistent   Frequency of Pain Constant   Result of Injury No           Constitutional:  Well developed, well nourished, in no acute distress. Psychiatric: Affect and mood are appropriate. Integumentary: No rashes or abrasions noted on exposed areas.         SPINE/MUSCULOSKELETAL EXAM    Cervical spine:  Neck is midline.    Normal muscle tone.    No focal atrophy is noted.    ROM pain free.    Shoulder ROM intact.    Mild tenderness to palpation.    Negative Spurling's sign.    Negative Tinel's sign.    Negative De Leon's sign.       Sensation in the bilateral arms grossly intact to light touch.        Lumbar spine:  No rash, ecchymosis, or gross obliquity.    No fasciculations.    No focal atrophy is noted.    No pain with hip ROM.    Range of motion is normal.    Diffuse Tenderness to palpation, lumbar paraspinal muscles.    No tenderness to palpation at the sciatic notch.    SI joints non-tender.    Trochanters non tender.      Sensation in the bilateral legs grossly intact to light touch.     Updates from 04/05/17:  Tenderness to palpation R>L. MOTOR:      Biceps  Triceps Deltoids Wrist Ext Wrist Flex Hand Intrin   Right 5/5 5/5 5/5 5/5 5/5 5/5   Left 5/5 5/5 5/5 5/5 5/5 5/5             Hip Flex  Quads Hamstrings Ankle DF EHL Ankle PF   Right 5/5 5/5 5/5 5/5 5/5 5/5   Left 5/5 5/5 5/5 5/5 5/5 5/5     DTRs are 2+ biceps, triceps, brachioradialis, patella, and Achilles.      Negative Straight Leg raise. Squat not tested. No difficulty with tandem gait. Normal heel walk. Normal toe rise.       Ambulation without assistive device. FWB.       RADIOGRAPHS  Lumbar MRI films from 4/5/2016 personally reviewed with patient:  1. Similar appearing degenerative disc disease L5/S1. 2. Mild multilevel facet arthrosis. No central canal stenosis. No high-grade  foraminal stenosis. Cervical MRI images taken on 4/17/2017 personally reviewed with patient:  Comparison November 8, 2007     Mild reversal of cervical curvature, centered at C4-C5. Similar interbody fusion  at C5 and C6. No compression fracture or pathologic marrow signal. No  spondylolisthesis. Paraspinous soft tissues unremarkable. Craniocervical  junction remains normal. Spinal cord shows normal signal intensity and  morphology.     C2-C3: No disc herniation or central stenosis. No foraminal stenosis.     C3-C4: Minimal posterior disc bulge. No cord contact. Mild facet hypertrophy  with mild bilateral foraminal narrowing, stable.     C4-C5: Mild posterior disc bulge, slightly contacting but not compressing spinal  cord. Posterior CSF present. AP canal measures 9 mm, mild central stenosis,  stable. Mild facet hypertrophy with mild bilateral foraminal narrowing, also  stable.     C5-C6: No disc material. No central or foraminal stenosis.     C6-C7: More severe posterior disc bulge, slightly worse than before with mild  cord contact. Facet and ligamentous hypertrophy also present. AP canal measures  6 mm, moderate to severe central stenosis. No cord compression or edema. Moderately severe bilateral foraminal stenosis. Slightly worse.     C7-T1: No disc herniation, central stenosis or cord compression. Mild bilateral  foraminal narrowing from facet hypertrophy.     IMPRESSION  IMPRESSION: Slight interval progression of degenerative disc disease at C6-C7,  the level below fusion, with moderate to severe central stenosis, cord contact  but no cord compression or edema. Moderately severe bilateral foraminal  stenosis. Less severe disease at the level above fusion, C4-C5, stable and as  above.        reviewed      This plan was reviewed with the patient and patient agrees. All questions were answered. More than half of this visit today was spent on counseling.     Written by Lola Chan, as dictated by Dr. Gates Favre, Dr. Austin Parada, confirm that all documentation is accurate.

## 2017-04-28 NOTE — LETTER
NOTIFICATION RETURN TO WORK 
 
4/28/2017 10:39 AM 
 
Ms. Greig [address] [place] [phone] To Whom It May Concern: 
 
Kiana BARBER Irizarry is currently under the care of [facility]. She was seen today on 4/28/2017. Ms. Thomas Toscano will return to work on Monday, May 1st 2017 on full duty. Her condition will be reevaluated in 5 weeks. If there are questions or concerns please have the patient contact our office.  
 
 
 
Sincerely, 
 
 
Monika Kay MD

## 2017-04-28 NOTE — LETTER
NOTIFICATION RETURN TO WORK / SCHOOL 
 
4/28/2017 10:55 AM 
 
Ms. Lj LONGORIA Blanco [address] [place] [phone] To Whom It May Concern: 
 
Kiana SUNIL Bondoy is currently under the care of [address] [place] [address]. She was seen today on 4/28/2017. Ms. Khanh Oropeza will return to work on Monday, May 1st 2017 on full duty. Her condition will be reevaluated in 5 weeks on 06/02/2017. If there are questions or concerns please have the patient contact our office.  
 
 
 
Sincerely, 
 
 
Kirk Grove MD

## 2017-04-28 NOTE — MR AVS SNAPSHOT
Visit Information Date & Time Provider Department Dept. Phone Encounter #  
 4/28/2017 10:30 AM Tiara Ledesma MD [facility] 21  Follow-up Instructions Return in about 5 weeks (around 6/2/2017), or if symptoms worsen or fail to improve. Upcoming Health Maintenance Date Due Hepatitis C Screening 1955 DTaP/Tdap/Td series (1 - Tdap) 7/19/1976 PAP AKA CERVICAL CYTOLOGY 7/19/1976 FOBT Q 1 YEAR AGE 50-75 7/19/2005 ZOSTER VACCINE AGE 60> 7/19/2015 INFLUENZA AGE 9 TO ADULT 8/1/2016 BREAST CANCER SCRN MAMMOGRAM 10/27/2016 Allergies as of 4/28/2017  Review Complete On: 4/28/2017 By: Julio Cesar Robertson LPN Severity Noted Reaction Type Reactions Calcium Iodide  03/29/2016    Unproven on Challenge Pt. denies Pneumovax 23 [Pneumococcal 23-alisha Ps Vaccine]    Unknown (comments) Shellfish Containing Products  04/06/2012    Swelling Current Immunizations  Never Reviewed Name Date Pneumococcal Polysaccharide (PPSV-23) 3/13/2013  5:42 PM  
  
 Not reviewed this visit You Were Diagnosed With   
  
 Codes Comments Cervical stenosis of spine    -  Primary ICD-10-CM: M48.02 
ICD-9-CM: 723.0 Vitals BP Pulse Temp Resp Height(growth percentile) Weight(growth percentile) 144/75 80 98.1 °F (36.7 °C) (Oral) 18 5' 7\" (1.702 m) 232 lb 12.8 oz (105.6 kg) SpO2 BMI OB Status Smoking Status 97% 36.46 kg/m2 Postmenopausal Former Smoker BMI and BSA Data Body Mass Index Body Surface Area  
 36.46 kg/m 2 2.23 m 2 Preferred Pharmacy Pharmacy Name Phone [address] [facility] [phone] Your Updated Medication List  
  
   
This list is accurate as of: 4/28/17 10:45 AM.  Always use your most recent med list.  
  
  
  
  
 aspirin 81 mg chewable tablet Take 81 mg by mouth daily. atorvastatin 40 mg tablet Commonly known as:  LIPITOR Take  by mouth daily. cholecalciferol (vitamin D3) 2,000 unit Tab Take  by mouth.  
  
 cyanocobalamin 500 mcg tablet Commonly known as:  VITAMIN B12 Take 500 mcg by mouth daily. cyclobenzaprine 10 mg tablet Commonly known as:  FLEXERIL Take 1 Tab by mouth three (3) times daily as needed for Muscle Spasm(s). esomeprazole 40 mg capsule Commonly known as:  NEXIUM  
  
 * gabapentin 300 mg capsule Commonly known as:  NEURONTIN Take 300 mg by mouth three (3) times daily. * gabapentin 300 mg capsule Commonly known as:  NEURONTIN Take 1 Cap by mouth three (3) times daily. losartan 100 mg tablet Commonly known as:  COZAAR Take 100 mg by mouth daily. metoclopramide HCl 5 mg tablet Commonly known as:  REGLAN Take 5 mg by mouth Before breakfast, lunch, and dinner. montelukast 10 mg tablet Commonly known as:  SINGULAIR Take 10 mg by mouth daily. NASONEX 50 mcg/actuation nasal spray Generic drug:  mometasone 2 Sprays daily. omeprazole 10 mg capsule Commonly known as:  PRILOSEC  
20 mg.  
  
 oxyCODONE-acetaminophen 5-325 mg per tablet Commonly known as:  PERCOCET Take 1 tablet every 4-6 hours as needed for pain control. If you were instructed to try over the counter ibuprofen or tylenol, only take the percocet for pain not controlled with the over the counter medication. PAXIL 20 mg tablet Generic drug:  PARoxetine Take  by mouth daily. TOPAMAX 50 mg tablet Generic drug:  topiramate Take  by mouth two (2) times a day. vitamin e 1,000 unit capsule Commonly known as:  E GEMS Take 1,000 Units by mouth daily. * Notice: This list has 2 medication(s) that are the same as other medications prescribed for you. Read the directions carefully, and ask your doctor or other care provider to review them with you. Prescriptions Sent to Pharmacy Refills  
 gabapentin (NEURONTIN) 300 mg capsule 2 Sig: Take 1 Cap by mouth three (3) times daily. Class: Normal  
 Pharmacy: [facility] [address], [address] [place] [address] #: [phone] Route: Oral  
  
Follow-up Instructions Return in about 5 weeks (around 6/2/2017), or if symptoms worsen or fail to improve. To-Do List   
 05/05/2017 Neurology:  EMG TWO EXTREMITIES UPPER Referral Information Referral ID Referred By Referred To  
  
 [id number] Shandra Oconnell, [address]26 Kylie Carranza MD   
   [address] [place] [address] Phone: [phone] Fax: [phone] Visits Status Start Date End Date 1 New Request 4/28/17 4/28/18 If your referral has a status of pending review or denied, additional information will be sent to support the outcome of this decision. Introducing [facility] & HEALTH SERVICES! Dear Antoinette Beltran: Thank you for requesting a One On One account. Our records indicate that you already have an active One On One account. You can access your account anytime at [email or web address] Did you know that you can access your hospital and ER discharge instructions at any time in One On One? You can also review all of your test results from your hospital stay or ER visit. Additional Information If you have questions, please visit the Frequently Asked Questions section of the One On One website at [email or web address] Remember, One On One is NOT to be used for urgent needs. For medical emergencies, dial 911. Now available from your iPhone and Android! Please provide this summary of care documentation to your next provider. Your primary care clinician is listed as Evangelina Sunshine. If you have any questions after today's visit, please call [phone].

## 2017-05-10 ENCOUNTER — DOCUMENTATION ONLY (OUTPATIENT)
Dept: ORTHOPEDIC SURGERY | Age: 62
End: 2017-05-10

## 2017-05-10 NOTE — PROGRESS NOTES
5/10/17 Pt came to the office and picked up her [facility] form. She will fax to [facility].

## 2017-05-26 ENCOUNTER — OFFICE VISIT (OUTPATIENT)
Dept: NEUROLOGY | Age: 62
End: 2017-05-26

## 2017-05-26 DIAGNOSIS — G56.01 CARPAL TUNNEL SYNDROME OF RIGHT WRIST: Primary | ICD-10-CM

## 2017-05-26 NOTE — LETTER
5/26/2017 10:41 AM 
 
Patient:  Seven Apodaca YOB: 1955 Date of Visit: 5/26/2017 Dear Rambo Gregory MD 
[facility] [address] [facility] [address] VIA Facsimile: [phone] Lyndon Taylor MD 
[address] [place] [address] [address] [facility] [address] VIA In Basket 
 : Thank you for referring Ms. Kiana Irizarry to me for evaluation/treatment. Below are the relevant portions of my assessment and plan of care. [facility] [address], [address] 
[phone]      [place] [address] Neurophysiology Report Patient: Elsie Sofia ID: [id number] Physician: Kati Moran. Richard Byrne MD  
Gender: Female Ref Phys: Dominique Christiansen MD  
Handedness:     
Study Date: May 26, 2017 Patient History: This 70-year-old woman has had at least a months worth of right arm and hand pain. She says the pain started up in the shoulder and radiating down into the arm. She is status post cervical disc fusion back in the late 1980s. On brief exam she has decreased  strength on the right side. Sensation is intact. Reflexes are 2+ and symmetrical. 
 
 
Nerve Conduction Studies Anti Sensory Summary Table Stim Site NR Peak (ms) Norm Peak (ms) O-P Amp (µV) Norm O-P Amp Dist (cm) Jad (m/s) Left Median 2nd Digit Anti Sensory (2nd Digit) Wrist    3.6 <3.5 17.1 >20 13.0 72.2 Site 2    3.6  20.3 Site 3    3.7  21.3 Site 4    3.8  20.7 Right Median 2nd Digit Anti Sensory (2nd Digit) Wrist    3.2 <3.5 19.7 >20 13.0 72.2 Site 2    3.2  17.2 Site 3    3.3  10.3 Left Ulnar Anti Sensory (5th Digit ) Wrist    3.0 <3.1 13.8 >17.0 11.0 50.0 Site 2    3.0  12.9 Site 3    2.9  31.3 Right Ulnar Anti Sensory (5th Digit ) Wrist    2.7 <3.1 14.8 >17.0 11.0 57.9 Site 2    2.8  15.9 Site 3    2.8  18.4 Motor Summary Table Stim Site NR Onset (ms) Norm Onset (ms) O-P Amp (mV) Norm O-P Amp Dist (cm) Jad (m/s) Norm Jad (m/s) Left Median Motor (Abd Poll Brev) Wrist    3.9 <4.4 7.4 >4.0 21.0 50.0 >49 Elbow    8.1  5.1 Right Median Motor (Abd Poll Brev) Wrist    3.7 <4.4 8.1 >4.0 21.0 52.5 >49 Elbow    7.7  8.5 Left Ulnar Motor (Abd Dig Minimi ) Wrist    2.6 <3.3 6.6 >6.0 22.0 50.0 >49 B Elbow    7.0  5.1  9.0 64.3 >50 A Elbow    8.4  4.8 Right Ulnar Motor (Abd Dig Minimi ) Wrist    2.3 <3.3 6.8 >6.0 21.0 48.8 >49 B Elbow    6.6  6.8  12.0 57.1 >50 A Elbow    8.7  6.2 EMG Side Muscle Nerve Root Ins Act Fibs Psw Fasc Amp Dur Poly Recrt Int Emerald Regulus Comment Right Deltoid Axillary C5-6 Nml Nml Nml None Nml Nml 0 Nml Nml Right Biceps Musculocut C5-6 Nml Nml Nml None Nml Nml 0 Nml Nml Right Triceps Radial C6-7-8 Nml Nml Nml None Nml Nml 0 Nml Nml Right FlexCarRad Median C6-7 Nml Nml Nml None Nml Nml 0 Nml Nml Right 1stDorInt Ulnar C8-T1 Nml Nml Nml None Nml Nml 0 Nml Nml Right Abd Poll Brev Median C8-T1 Nml Nml Nml None Nml Nml 0 Nml Nml Right Cervical Parasp Up Rami C1-3 Nml Nml Nml Right Cervical Parasp Mid Rami C4-6 Nml Nml Nml Right Cervical Parasp Low Rami C7-8 Nml Nml Nml NCS/EMG FINDINGS: 
 
? Evaluation of the Left median motor, the Right median motor, the Left ulnar motor, the Right Median 2nd Digit sensory, the Left ulnar sensory, and the Right ulnar sensory nerves were unremarkable. ? The Left Median 2nd Digit sensory nerve showed prolonged distal peak latency (3.6 ms). INTERPRETATION: This was a mildly abnormal nerve conduction EMG study showing it to be some mild prolongation of the right median nerve across the level of the wrist consistent with early right-sided carpal tunnel syndrome. No sign of cervical radiculopathy was identified in the right upper extremity. ___________________________ Apryl Elder MD 
 
 
 
 
 Waveforms: If you have questions, please do not hesitate to call me. I look forward to following MsChen Bondoy along with you.  
 
 
 
Sincerely, 
 
 
Clovis Bloom MD

## 2017-05-26 NOTE — PROGRESS NOTES
[facility]  [address] [facility] [place], [address]  [phone]      [facility]    Neurophysiology Report      Patient: Gerald Taylor     ID: [id number] Physician: Williams Dickey. Kaleigh Godfrey MD   Gender: Female Ref Phys: Asad Avila MD   Handedness:      Study Date: May 26, 2017         Patient History: This 19-year-old woman has had at least a months worth of right arm and hand pain. She says the pain started up in the shoulder and radiating down into the arm. She is status post cervical disc fusion back in the late 1980s. On brief exam she has decreased  strength on the right side. Sensation is intact.   Reflexes are 2+ and symmetrical.      Nerve Conduction Studies  Anti Sensory Summary Table     Stim Site NR Peak (ms) Norm Peak (ms) O-P Amp (µV) Norm O-P Amp Dist (cm) Jad (m/s)   Left Median 2nd Digit Anti Sensory (2nd Digit)   Wrist    3.6 <3.5 17.1 >20 13.0 72.2   Site 2    3.6  20.3      Site 3    3.7  21.3      Site 4    3.8  20.7      Right Median 2nd Digit Anti Sensory (2nd Digit)   Wrist    3.2 <3.5 19.7 >20 13.0 72.2   Site 2    3.2  17.2      Site 3    3.3  10.3      Left Ulnar Anti Sensory (5th Digit )   Wrist    3.0 <3.1 13.8 >17.0 11.0 50.0   Site 2    3.0  12.9      Site 3    2.9  31.3      Right Ulnar Anti Sensory (5th Digit )   Wrist    2.7 <3.1 14.8 >17.0 11.0 57.9   Site 2    2.8  15.9      Site 3    2.8  18.4        Motor Summary Table     Stim Site NR Onset (ms) Norm Onset (ms) O-P Amp (mV) Norm O-P Amp Dist (cm) Jad (m/s) Norm Jad (m/s)   Left Median Motor (Abd Poll Brev)   Wrist    3.9 <4.4 7.4 >4.0 21.0 50.0 >49   Elbow    8.1  5.1       Right Median Motor (Abd Poll Brev)   Wrist    3.7 <4.4 8.1 >4.0 21.0 52.5 >49   Elbow    7.7  8.5       Left Ulnar Motor (Abd Dig Minimi )   Wrist    2.6 <3.3 6.6 >6.0 22.0 50.0 >49   B Elbow    7.0  5.1  9.0 64.3 >50   A Elbow    8.4  4.8       Right Ulnar Motor (Abd Dig Minimi )   Wrist    2.3 <3.3 6.8 >6.0 21.0 48.8 >49   B Elbow    6.6  6.8  12.0 57.1 >50   A Elbow    8.7  6.2           EMG     Side Muscle Nerve Root Ins Act Fibs Psw Fasc Amp Dur Poly Recrt Int Isabella Crea Comment   Right Deltoid Axillary C5-6 Nml Nml Nml None Nml Nml 0 Nml Nml    Right Biceps Musculocut C5-6 Nml Nml Nml None Nml Nml 0 Nml Nml    Right Triceps Radial C6-7-8 Nml Nml Nml None Nml Nml 0 Nml Nml    Right FlexCarRad Median C6-7 Nml Nml Nml None Nml Nml 0 Nml Nml    Right 1stDorInt Ulnar C8-T1 Nml Nml Nml None Nml Nml 0 Nml Nml    Right Abd Poll Brev Median C8-T1 Nml Nml Nml None Nml Nml 0 Nml Nml    Right Cervical Parasp Up Rami C1-3 Nml Nml Nml          Right Cervical Parasp Mid Rami C4-6 Nml Nml Nml          Right Cervical Parasp Low Rami C7-8 Nml Nml Nml            NCS/EMG FINDINGS:     Evaluation of the Left median motor, the Right median motor, the Left ulnar motor, the Right Median 2nd Digit sensory, the Left ulnar sensory, and the Right ulnar sensory nerves were unremarkable.  The Left Median 2nd Digit sensory nerve showed prolonged distal peak latency (3.6 ms). INTERPRETATION: This was a mildly abnormal nerve conduction EMG study showing it to be some mild prolongation of the right median nerve across the level of the wrist consistent with early right-sided carpal tunnel syndrome. No sign of cervical radiculopathy was identified in the right upper extremity. ___________________________  Roxy Castillo MD          Waveforms:                      DIL[address]  OFFICE PROCEDURE PROGRESS NOTE        Chart reviewed for the following:   Melanie Singh MD, have reviewed the History, Physical and updated the Allergic reactions for Kiana Irizarry     TIME OUT performed immediately prior to start of procedure:   Melanie iSngh MD, have performed the following reviews on Kiana Irizarry prior to the start of the procedure:            * Patient was identified by name and date of birth   * Agreement on procedure being performed was verified  * Risks and Benefits explained to the patient  * Procedure site verified and marked as necessary  * Patient was positioned for comfort  * Consent was signed and verified     Time: 10:40 AM    Date of procedure: 5/26/2017    Procedure performed by:  Emely Nicole MD    Provider assisted by: Rashaad Milner     Patient assisted by: self    How tolerated by patient: tolerated the procedure well with no complications    Post Procedural Pain Scale: 0 - No Hurt    Comments: none

## 2017-05-26 NOTE — COMMUNICATION BODY
[facility]  [address] [place] [address] [place], [address]  [phone]      [facility] [place] [address]    Neurophysiology Report      Patient: Gabriel Yepez     ID: [id number] Physician: Thuy Castillo. Gala Israel MD   Gender: Female Ref Phys: Edita Pearce MD   Handedness:      Study Date: May 26, 2017         Patient History: This 77-year-old woman has had at least a months worth of right arm and hand pain. She says the pain started up in the shoulder and radiating down into the arm. She is status post cervical disc fusion back in the late 1980s. On brief exam she has decreased  strength on the right side. Sensation is intact.   Reflexes are 2+ and symmetrical.      Nerve Conduction Studies  Anti Sensory Summary Table     Stim Site NR Peak (ms) Norm Peak (ms) O-P Amp (µV) Norm O-P Amp Dist (cm) Jad (m/s)   Left Median 2nd Digit Anti Sensory (2nd Digit)   Wrist    3.6 <3.5 17.1 >20 13.0 72.2   Site 2    3.6  20.3      Site 3    3.7  21.3      Site 4    3.8  20.7      Right Median 2nd Digit Anti Sensory (2nd Digit)   Wrist    3.2 <3.5 19.7 >20 13.0 72.2   Site 2    3.2  17.2      Site 3    3.3  10.3      Left Ulnar Anti Sensory (5th Digit )   Wrist    3.0 <3.1 13.8 >17.0 11.0 50.0   Site 2    3.0  12.9      Site 3    2.9  31.3      Right Ulnar Anti Sensory (5th Digit )   Wrist    2.7 <3.1 14.8 >17.0 11.0 57.9   Site 2    2.8  15.9      Site 3    2.8  18.4        Motor Summary Table     Stim Site NR Onset (ms) Norm Onset (ms) O-P Amp (mV) Norm O-P Amp Dist (cm) Jad (m/s) Norm Jad (m/s)   Left Median Motor (Abd Poll Brev)   Wrist    3.9 <4.4 7.4 >4.0 21.0 50.0 >49   Elbow    8.1  5.1       Right Median Motor (Abd Poll Brev)   Wrist    3.7 <4.4 8.1 >4.0 21.0 52.5 >49   Elbow    7.7  8.5       Left Ulnar Motor (Abd Dig Minimi )   Wrist    2.6 <3.3 6.6 >6.0 22.0 50.0 >49   B Elbow    7.0  5.1  9.0 64.3 >50   A Elbow    8.4  4.8       Right Ulnar Motor (Abd Dig Minimi )   Wrist    2.3 <3.3 6.8 >6.0 21.0 48.8 >49   B Elbow    6.6  6.8  12.0 57.1 >50   A Elbow    8.7  6.2           EMG     Side Muscle Nerve Root Ins Act Fibs Psw Fasc Amp Dur Poly Recrt Int Odette Pion Comment   Right Deltoid Axillary C5-6 Nml Nml Nml None Nml Nml 0 Nml Nml    Right Biceps Musculocut C5-6 Nml Nml Nml None Nml Nml 0 Nml Nml    Right Triceps Radial C6-7-8 Nml Nml Nml None Nml Nml 0 Nml Nml    Right FlexCarRad Median C6-7 Nml Nml Nml None Nml Nml 0 Nml Nml    Right 1stDorInt Ulnar C8-T1 Nml Nml Nml None Nml Nml 0 Nml Nml    Right Abd Poll Brev Median C8-T1 Nml Nml Nml None Nml Nml 0 Nml Nml    Right Cervical Parasp Up Rami C1-3 Nml Nml Nml          Right Cervical Parasp Mid Rami C4-6 Nml Nml Nml          Right Cervical Parasp Low Rami C7-8 Nml Nml Nml            NCS/EMG FINDINGS:     Evaluation of the Left median motor, the Right median motor, the Left ulnar motor, the Right Median 2nd Digit sensory, the Left ulnar sensory, and the Right ulnar sensory nerves were unremarkable.  The Left Median 2nd Digit sensory nerve showed prolonged distal peak latency (3.6 ms). INTERPRETATION: This was a mildly abnormal nerve conduction EMG study showing it to be some mild prolongation of the right median nerve across the level of the wrist consistent with early right-sided carpal tunnel syndrome. No sign of cervical radiculopathy was identified in the right upper extremity. ___________________________  Chelly Nation MD          Waveforms:

## 2017-06-02 ENCOUNTER — OFFICE VISIT (OUTPATIENT)
Dept: ORTHOPEDIC SURGERY | Age: 62
End: 2017-06-02

## 2017-06-02 VITALS
HEART RATE: 82 BPM | WEIGHT: 233 LBS | SYSTOLIC BLOOD PRESSURE: 136 MMHG | RESPIRATION RATE: 18 BRPM | BODY MASS INDEX: 36.57 KG/M2 | TEMPERATURE: 98.5 F | DIASTOLIC BLOOD PRESSURE: 70 MMHG | OXYGEN SATURATION: 97 % | HEIGHT: 67 IN

## 2017-06-02 DIAGNOSIS — M79.18 MYOFASCIAL PAIN: ICD-10-CM

## 2017-06-02 DIAGNOSIS — G56.01 CARPAL TUNNEL SYNDROME OF RIGHT WRIST: Primary | ICD-10-CM

## 2017-06-02 RX ORDER — GABAPENTIN 300 MG/1
600 CAPSULE ORAL 3 TIMES DAILY
Qty: 180 CAP | Refills: 2 | Status: SHIPPED | OUTPATIENT
Start: 2017-06-02

## 2017-06-02 NOTE — PATIENT INSTRUCTIONS
Gabapentin (By mouth)   Gabapentin (nelia-a-PEN-tin)  Treats seizures and pain caused by shingles. Brand Name(s): ACTIVE-PAC with Gabapentin, Convenience Shiv, Cyclo/Clair 10/300 Pack, FusePaq Fanatrex, Clair-V, Ehingen, Neurontin, SmartRx Clair Kit   There may be other brand names for this medicine. When This Medicine Should Not Be Used: This medicine is not right for everyone. Do not use it if you had an allergic reaction to gabapentin. How to Use This Medicine:   Capsule, Liquid, Tablet  · Take your medicine as directed. Your dose may need to be changed several times to find what works best for you. If you have epilepsy, do not allow more than 12 hours to pass between doses. · Capsule: Swallow the capsule whole with plenty of water. Do not open, crush, or chew it. · Gralise® tablet: Swallow the tablet whole . Do not crush, break, or chew it. · Neurontin® tablet: If you break a tablet into 2 pieces, use the second half as your next dose. If you don't use it within 28 days, throw it away. · Measure the oral liquid medicine with a marked measuring spoon, oral syringe, or medicine cup. · This medicine should come with a Medication Guide. Ask your pharmacist for a copy if you do not have one. · Missed dose: Take a dose as soon as you remember. If it is almost time for your next dose, wait until then and take a regular dose. Do not take extra medicine to make up for a missed dose. · Store the medicine in a closed container at room temperature, away from heat, moisture, and direct light. Store the Neurontin® oral liquid in the refrigerator. Do not freeze. Drugs and Foods to Avoid:   Ask your doctor or pharmacist before using any other medicine, including over-the-counter medicines, vitamins, and herbal products. · Some medicines can affect how gabapentin works.  Tell your doctor if you also use any of the following:   ¨ Hydrocodone  ¨ Morphine  · If you take an antacid, wait at least 2 hours before you take gabapentin. · Tell your doctor if you use anything else that makes you sleepy. Some examples are allergy medicine, narcotic pain medicine, and alcohol. Warnings While Using This Medicine:   · Tell your doctor if you are pregnant or breastfeeding, or if you have kidney problems or are receiving dialysis. Tell your doctor if you have a history of depression or mental health problems. · This medicine may increase depression or thoughts of suicide. Tell your doctor right away if you start to feel more depressed or think about hurting yourself. · This medicine may cause a serious allergic reaction called multiorgan hypersensitivity, which can damage organs and be life-threatening. · Do not stop using this medicine suddenly. Your doctor will need to slowly decrease your dose before you stop it completely. If you take this medicine to prevent seizures, your seizures may return or occur more often if you stop this medicine suddenly. · This medicine may make you dizzy or drowsy. Do not drive or do anything else that could be dangerous until you know how this medicine affects you. · Tell any doctor or dentist who treats you that you are using this medicine. This medicine may affect certain medical test results. · Your doctor will check your progress and the effects of this medicine at regular visits. Keep all appointments. · Keep all medicine out of the reach of children. Never share your medicine with anyone.   Possible Side Effects While Using This Medicine:   Call your doctor right away if you notice any of these side effects:  · Allergic reaction: Itching or hives, swelling in your face or hands, swelling or tingling in your mouth or throat, chest tightness, trouble breathing  · Behavior problems, aggression, restlessness, trouble concentrating, moodiness (especially in children)  · Blistering, peeling, red skin rash  · Change in how much or how often you urinate, bloody or cloudy urine,  · Chest pain, fast heartbeat, trouble breathing  · Dark urine or pale stools, nausea, vomiting, loss of appetite, stomach pain, yellow skin or eyes  · Fever, rash, swollen or tender glands in the neck, armpit, or groin  · Problems with coordination, shakiness, unsteadiness  · Rapid weight gain, swelling in your hands, ankles, or feet  · Unusual moods or behaviors, thoughts of hurting yourself, feeling depressed  If you notice these less serious side effects, talk with your doctor:   · Dizziness, drowsiness, sleepiness, tiredness  If you notice other side effects that you think are caused by this medicine, tell your doctor. Call your doctor for medical advice about side effects. You may report side effects to FDA at 5-780-PIX-0939  © 2017 [address] [employer] Information is for End User's use only and may not be sold, redistributed or otherwise used for commercial purposes. The above information is an  only. It is not intended as medical advice for individual conditions or treatments. Talk to your doctor, nurse or pharmacist before following any medical regimen to see if it is safe and effective for you. Gabapentin (Neurontin) instructions: We will increase your current dose from 300 mg (1 pill) three times a day to 600 mg (2 pills) three times a day. Morning Afternoon Night   Week 1 1 pill 1 pill 2 pills   Week 2 2 pills 1 pill 2 pills   Week 3 and onwards 2 pills 2 pills 2 pills       Week 1: Take an additional 300 mg pill at night to your current dose so that you are taking 1 pill in the morning, 1 pill in the afternoon, and 2 pills at night. Week 2: Take an additional 300 mg pill in the morning so that you are taking 2 pills in the morning, 1 pill in the afternoon, and 2 pills at night. Week 3 and onwards: Take an additional 300 mg pill in the afternoon so that you are taking 2 pills in the morning, 2 pills in the afternoon, and 2 pills at night. Continue taking this dose each day.

## 2017-06-02 NOTE — PROGRESS NOTES
[address], [address]  [place], [address] [place] [address] Street  Phone: [phone]  Fax: [phone]        Wesley Doctor  : 1955  PCP: Tammy Erazo MD  2017    PROGRESS NOTE      ASSESSMENT AND PLAN    Kiana Irizarry comes in to the office today for bilateral upper ext EMG and medication f/u. The EMG did not show evidence of cervical radiculopathy. Although it showed mild prolongation of the right median nerve across the level of the wrist consistent with right-sided carpal tunnel syndrome. She has already had carpal tunnel release and was not interested in further interventional treatment. We also discussed cervical interlaminar injections. She ws prescribed an increased dose of Gabapentin (600 TID form 300 TID). Pt will f/u in 4 weeks or sooner as needed. Sanaz Todd was seen today for neck pain. Diagnoses and all orders for this visit:    Carpal tunnel syndrome of right wrist  -     gabapentin (NEURONTIN) 300 mg capsule; Take 2 Caps by mouth three (3) times daily. Myofascial pain  -     gabapentin (NEURONTIN) 300 mg capsule; Take 2 Caps by mouth three (3) times daily. Follow-up Disposition: Not on File      HISTORY OF PRESENT ILLNESS  Lisa Lopez is a 64 y.o. female.       A&P / HPI from 2016:  Estela Lang was in the office for a lumbar MRI f/u. Her pain is likely due to facet syndrome and myofascial pain. Pt was informed about the theracane. She will be referred to pain management to be evaluated for median branch neurotomy. The risks, benefits, and potential side effects of this procedure were discussed.       Pt was prescribed diclofenac 75mg BID prn. The risks, benefits, and potential side effects of this medication were discussed.       PT for potential pelvic floor dysfunction was discussed but she said she would like to wait.      She will f/u in 3 months, or prn.       Updates from 17:  Pt presents for a prn f/u. She was previously seen for lumbar pain but returns today because of a new cervical pain that radiates down her right arm into the the first and second digits of her hand. She feels the numbness more in her palmar side. Pt recently completed a steroid pack which provided great relief for her symptoms. Her pain/symptoms are likely due to a cervical radiculopathy. There is also a component of myofascial pain.      She reminds me she has had a C4-5 fusion.     Updates from 04/28/17:  Pt presents for a cervical MRI f/u. The MRI shows moderate to severe central stenosis at C6-7 with moderately severe bilateral foraminal stenosis at the same level. Pt continues to have radiating right arm pain.       Updates from 06/02/17:  Pt presents for bilateral upper ext EMG and medication f/u. The EMG did not show evidence of cervical radiculopathy. Although it showed mild prolongation of the right median nerve across the level of the wrist consistent with early right-sided carpal tunnel syndrome. PAST MEDICAL HISTORY   Past Medical History:   Diagnosis Date    Anemia     Arthritis     knees back     Arthritis     Bronchitis     Chest tightness     Chronic fatigue syndrome     Chronic pain     left knee    Colon polyps     Depression     Diabetes mellitus (HCC)     Elevated cholesterol     Heart disease     Heart murmur     Heart murmur     History of echocardiogram 04/17/2012    EF 60-65%. Mild conc LVH. RVSP 15-20. No significant valvular heart disease.     Hypercholesterolemia     Left knee pain     Lumbar radicular pain     Microscopic hematuria     Migraine headache     Milk intolerance     Palpitations     Renal cyst     YEE (stress urinary incontinence), male     Swelling     Syncope     Vitamin D deficiency        Past Surgical History:   Procedure Laterality Date    HX COLONOSCOPY  2/18/2014    Polyp (5mm) in the proximal sigmoid colon. (polypectomy)    HX COLONOSCOPY 04/07/2009    HX COLONOSCOPY  4/10/2006    HX HYSTERECTOMY  1989    HX KNEE ARTHROSCOPY      HX KNEE ARTHROSCOPY      HX KNEE REPLACEMENT  03/2013    left knee replacement    HX MENISCECTOMY      HX ORTHOPAEDIC      neck fusion  c4 c5    HX ORTHOPAEDIC      arthroscopy left knee    HX OTHER SURGICAL  2009    Colon polyp removed   . MEDICATIONS      Current Outpatient Prescriptions   Medication Sig Dispense Refill    omeprazole (PRILOSEC) 10 mg capsule 20 mg.      gabapentin (NEURONTIN) 300 mg capsule Take 1 Cap by mouth three (3) times daily. 90 Cap 2    aspirin 81 mg chewable tablet Take 81 mg by mouth daily.  vitamin e (E GEMS) 1,000 unit capsule Take 1,000 Units by mouth daily.  esomeprazole (NEXIUM) 40 mg capsule       cholecalciferol, vitamin D3, 2,000 unit tab Take  by mouth.  cyanocobalamin (VITAMIN B12) 500 mcg tablet Take 500 mcg by mouth daily.  montelukast (SINGULAIR) 10 mg tablet Take 10 mg by mouth daily.  atorvastatin (LIPITOR) 40 mg tablet Take  by mouth daily.  gabapentin (NEURONTIN) 300 mg capsule Take 300 mg by mouth three (3) times daily.  losartan (COZAAR) 100 mg tablet Take 100 mg by mouth daily.  metoclopramide HCl (REGLAN) 5 mg tablet Take 5 mg by mouth Before breakfast, lunch, and dinner.  mometasone (NASONEX) 50 mcg/actuation nasal spray 2 Sprays daily.  PARoxetine (PAXIL) 20 mg tablet Take  by mouth daily.  topiramate (TOPAMAX) 50 mg tablet Take  by mouth two (2) times a day.  oxyCODONE-acetaminophen (PERCOCET) 5-325 mg per tablet Take 1 tablet every 4-6 hours as needed for pain control. If you were instructed to try over the counter ibuprofen or tylenol, only take the percocet for pain not controlled with the over the counter medication. 12 Tab 0    cyclobenzaprine (FLEXERIL) 10 mg tablet Take 1 Tab by mouth three (3) times daily as needed for Muscle Spasm(s).  15 Tab 0        ALLERGIES    Allergies   Allergen Reactions    Calcium Iodide Unproven on Challenge     Pt. denies    Pneumovax 23 [Pneumococcal 23-Amita Ps Vaccine] Unknown (comments)    Shellfish Containing Products Swelling          SOCIAL HISTORY    Social History     Social History    Marital status:      Spouse name: N/A    Number of children: N/A    Years of education: N/A     Social History Main Topics    Smoking status: Former Smoker     Types: Cigarettes     Quit date: 1992    Smokeless tobacco: Never Used      Comment: Quit in 1992, smoked 1 pack a week    Alcohol use No    Drug use: No    Sexual activity: Not Currently     Other Topics Concern    None     Social History Narrative       FAMILY HISTORY    Family History   Problem Relation Age of Onset    Diabetes Mother     Heart Surgery Father     Cancer Father     Hypertension Sister     Diabetes Brother     High Cholesterol Sister     Diabetes Sister     Breast Cancer Sister     HIV/AIDS Brother     Breast Cancer Other     Ovarian Cancer Maternal Aunt          REVIEW OF SYSTEMS  Review of Systems   Constitutional: Negative for chills, diaphoresis, fever, malaise/fatigue and weight loss. Respiratory: Negative for shortness of breath. Cardiovascular: Negative for chest pain and leg swelling. Gastrointestinal: Negative for constipation, nausea and vomiting. Neurological: Negative for dizziness, tingling, seizures, loss of consciousness and headaches. Psychiatric/Behavioral: The patient does not have insomnia. PHYSICAL EXAMINATION  Visit Vitals    /70    Pulse 82    Temp 98.5 °F (36.9 °C) (Oral)    Resp 18    Ht 5' 7\" (1.702 m)    Wt 233 lb (105.7 kg)    SpO2 97%    BMI 36.49 kg/m2       Pain Assessment  4/28/2017   Location of Pain Neck;Back;Finger   Location Modifiers Right   Severity of Pain 3   Quality of Pain Aching; Other (Comment)   Quality of Pain Comment tingling to right hand/fingers   Duration of Pain Persistent   Frequency of Pain Constant   Result of Injury No           Constitutional:  Well developed, well nourished, in no acute distress. Psychiatric: Affect and mood are appropriate. Integumentary: No rashes or abrasions noted on exposed areas. SPINE/MUSCULOSKELETAL EXAM    Cervical spine:  Neck is midline.    Normal muscle tone.    No focal atrophy is noted.    ROM pain free.    Shoulder ROM intact.    Mild tenderness to palpation.    Negative Spurling's sign.    Negative Tinel's sign.    Negative De Leon's sign.       Sensation in the bilateral arms grossly intact to light touch.        Lumbar spine:  No rash, ecchymosis, or gross obliquity.    No fasciculations.    No focal atrophy is noted.    No pain with hip ROM.    Range of motion is normal.    Diffuse Tenderness to palpation, lumbar paraspinal muscles.    No tenderness to palpation at the sciatic notch.    SI joints non-tender.    Trochanters non tender.      Sensation in the bilateral legs grossly intact to light touch.      Updates from 04/05/17:  Tenderness to palpation R>L. MOTOR:      Biceps  Triceps Deltoids Wrist Ext Wrist Flex Hand Intrin   Right 5/5 5/5 5/5 5/5 5/5 5/5   Left 5/5 5/5 5/5 5/5 5/5 5/5             Hip Flex  Quads Hamstrings Ankle DF EHL Ankle PF   Right 5/5 5/5 5/5 5/5 5/5 5/5   Left 5/5 5/5 5/5 5/5 5/5 5/5     DTRs are 2+ biceps, triceps, brachioradialis, patella, and Achilles.      Negative Straight Leg raise. Squat not tested. No difficulty with tandem gait. Normal heel walk. Normal toe rise.       Ambulation without assistive device. FWB.       RADIOGRAPHS  Bilateral upper ext EMG taken on 05/26/2017 personally reviewed with patient:  NCS/EMG FINDINGS:     · Evaluation of the Left median motor, the Right median motor, the Left ulnar motor, the Right Median 2nd Digit sensory, the Left ulnar sensory, and the Right ulnar sensory nerves were unremarkable.   · The Left Median 2nd Digit sensory nerve showed prolonged distal peak latency (3.6 ms).       INTERPRETATION: This was a mildly abnormal nerve conduction EMG study showing it to be some mild prolongation of the right median nerve across the level of the wrist consistent with early right-sided carpal tunnel syndrome. No sign of cervical radiculopathy was identified in the right upper extremity.       Lumbar MRI films from 4/5/2016 personally reviewed with patient:  1. Similar appearing degenerative disc disease L5/S1. 2. Mild multilevel facet arthrosis. No central canal stenosis. No high-grade  foraminal stenosis.     Cervical MRI images taken on 4/17/2017 personally reviewed with patient:  Comparison November 8, 2007      Mild reversal of cervical curvature, centered at C4-C5. Similar interbody fusion  at C5 and C6. No compression fracture or pathologic marrow signal. No  spondylolisthesis. Paraspinous soft tissues unremarkable. Craniocervical  junction remains normal. Spinal cord shows normal signal intensity and  morphology.      C2-C3: No disc herniation or central stenosis. No foraminal stenosis.     C3-C4: Minimal posterior disc bulge. No cord contact. Mild facet hypertrophy  with mild bilateral foraminal narrowing, stable.      C4-C5: Mild posterior disc bulge, slightly contacting but not compressing spinal  cord. Posterior CSF present. AP canal measures 9 mm, mild central stenosis,  stable. Mild facet hypertrophy with mild bilateral foraminal narrowing, also  stable.      C5-C6: No disc material. No central or foraminal stenosis.     C6-C7: More severe posterior disc bulge, slightly worse than before with mild  cord contact. Facet and ligamentous hypertrophy also present. AP canal measures  6 mm, moderate to severe central stenosis. No cord compression or edema. Moderately severe bilateral foraminal stenosis. Slightly worse.      C7-T1: No disc herniation, central stenosis or cord compression.  Mild bilateral  foraminal narrowing from facet hypertrophy.      IMPRESSION  IMPRESSION: Slight interval progression of degenerative disc disease at C6-C7,  the level below fusion, with moderate to severe central stenosis, cord contact  but no cord compression or edema. Moderately severe bilateral foraminal  stenosis. Less severe disease at the level above fusion, C4-C5, stable and as  above.        reviewed      This plan was reviewed with the patient and patient agrees. All questions were answered.  More than half of this visit today was spent on counseling.      Written by Dung Bartlett, as dictated by Dr. Carissa Manzano, Dr. Bonifacio Jeans, confirm that all documentation is accurate.

## 2018-07-17 ENCOUNTER — OFFICE VISIT (OUTPATIENT)
Dept: ORTHOPEDIC SURGERY | Age: 63
End: 2018-07-17

## 2018-07-17 VITALS
OXYGEN SATURATION: 94 % | TEMPERATURE: 98.6 F | HEART RATE: 86 BPM | WEIGHT: 234.8 LBS | DIASTOLIC BLOOD PRESSURE: 82 MMHG | BODY MASS INDEX: 36.85 KG/M2 | HEIGHT: 67 IN | SYSTOLIC BLOOD PRESSURE: 147 MMHG | RESPIRATION RATE: 16 BRPM

## 2018-07-17 DIAGNOSIS — Z98.1 S/P LUMBAR FUSION: ICD-10-CM

## 2018-07-17 DIAGNOSIS — M79.18 MYOFASCIAL PAIN: Primary | ICD-10-CM

## 2018-07-17 DIAGNOSIS — M54.50 LUMBAR PAIN: ICD-10-CM

## 2018-07-17 PROBLEM — E66.01 SEVERE OBESITY (BMI 35.0-39.9): Status: ACTIVE | Noted: 2018-07-17

## 2018-07-17 RX ORDER — HYDROCODONE BITARTRATE AND ACETAMINOPHEN 5; 325 MG/1; MG/1
TABLET ORAL
COMMUNITY
Start: 2021-01-07 | End: 2021-01-07

## 2018-07-17 RX ORDER — DOCUSATE SODIUM 250 MG
250 CAPSULE ORAL DAILY
COMMUNITY
Start: 2021-01-07 | End: 2021-01-07

## 2018-07-17 RX ORDER — ALBUTEROL SULFATE 90 UG/1
2 AEROSOL, METERED RESPIRATORY (INHALATION)
COMMUNITY

## 2018-07-17 RX ORDER — FLUTICASONE PROPIONATE 50 MCG
2 SPRAY, SUSPENSION (ML) NASAL DAILY
COMMUNITY

## 2018-07-17 NOTE — MR AVS SNAPSHOT
[address] [facility] ([place], [place]) [place] [address] [address] [place] [address] 
[phone] Patient: Emmett Zimmer MRN: [id number] Visit Information Date & Time Provider Department Dept. Phone Encounter #  
 7/17/2018  9:30 AM Alejo Wilcox MD [facility] [phone] [id number] Follow-up Instructions Return if symptoms worsen or fail to improve. Upcoming Health Maintenance Date Due Hepatitis C Screening 1955 DTaP/Tdap/Td series (1 - Tdap) 7/19/1976 PAP AKA CERVICAL CYTOLOGY 7/19/1976 FOBT Q 1 YEAR AGE 50-75 7/19/2005 ZOSTER VACCINE AGE 60> 5/19/2015 BREAST CANCER SCRN MAMMOGRAM 10/27/2016 Influenza Age 5 to Adult 8/1/2018 Allergies as of 7/17/2018  Review Complete On: 7/17/2018 By: Ivy Cam Severity Noted Reaction Type Reactions Calcium Iodide  03/29/2016    Unproven on Challenge Pt. denies Nickel    Itching Pneumovax 23 [Pneumococcal 23-alisha Ps Vaccine]    Unknown (comments) Shellfish Containing Products  04/06/2012    Swelling Current Immunizations  Never Reviewed Name Date Pneumococcal Polysaccharide (PPSV-23) 3/13/2013  5:42 PM  
  
 Not reviewed this visit You Were Diagnosed With   
  
 Codes Comments Myofascial pain    -  Primary ICD-10-CM: M79.1 ICD-9-CM: 729.1 Lumbar pain     ICD-10-CM: M54.5 ICD-9-CM: 724.2 S/P lumbar fusion     ICD-10-CM: Z98.1 ICD-9-CM: V45.4 Vitals BP Pulse Temp Resp Height(growth percentile) Weight(growth percentile) 147/82 86 98.6 °F (37 °C) 16 5' 7\" (1.702 m) 234 lb 12.8 oz (106.5 kg) SpO2 BMI OB Status Smoking Status 94% 36.77 kg/m2 Postmenopausal Former Smoker BMI and BSA Data Body Mass Index Body Surface Area  
 36.77 kg/m 2 2.24 m 2 Preferred Pharmacy Pharmacy Name Phone  [facility] [address], [place] [phone] Your Updated Medication List  
  
   
This list is accurate as of 7/17/18 10:54 AM.  Always use your most recent med list.  
  
  
  
  
 albuterol 90 mcg/actuation inhaler Commonly known as:  PROVENTIL HFA, VENTOLIN HFA, PROAIR HFA Take  by inhalation. aspirin 81 mg chewable tablet Take 81 mg by mouth daily. atorvastatin 40 mg tablet Commonly known as:  LIPITOR Take  by mouth daily. cholecalciferol (vitamin D3) 2,000 unit Tab Take  by mouth.  
  
 cyanocobalamin 500 mcg tablet Commonly known as:  VITAMIN B12 Take 500 mcg by mouth daily. cyclobenzaprine 10 mg tablet Commonly known as:  FLEXERIL Take 1 Tab by mouth three (3) times daily as needed for Muscle Spasm(s). docusate sodium 250 mg capsule Commonly known as:  [address] Take 250 mg by mouth daily. esomeprazole 40 mg capsule Commonly known as:  NEXIUM  
  
 FLONASE ALLERGY RELIEF 50 mcg/actuation nasal spray Generic drug:  fluticasone 2 Sprays by Both Nostrils route daily. gabapentin 300 mg capsule Commonly known as:  NEURONTIN Take 2 Caps by mouth three (3) times daily. glimepiride 2 mg tablet Commonly known as:  AMARYL Take  by mouth every morning. ibuprofen 800 mg tablet Commonly known as:  MOTRIN Take  by mouth.  
  
 losartan 100 mg tablet Commonly known as:  COZAAR Take 100 mg by mouth daily. metoclopramide HCl 5 mg tablet Commonly known as:  REGLAN Take 5 mg by mouth Before breakfast, lunch, and dinner. mirabegron ER 50 mg ER tablet Commonly known as:  MYRBETRIQ Take 1 Tab by mouth daily. Indications: URINARY URGE INCONTINENCE  
  
 montelukast 10 mg tablet Commonly known as:  SINGULAIR Take 10 mg by mouth daily. NASONEX 50 mcg/actuation nasal spray Generic drug:  mometasone 2 Sprays daily. NORCO 5-325 mg per tablet Generic drug:  HYDROcodone-acetaminophen Take  by mouth. oxyCODONE-acetaminophen 5-325 mg per tablet Commonly known as:  PERCOCET Take 1 tablet every 4-6 hours as needed for pain control. If you were instructed to try over the counter ibuprofen or tylenol, only take the percocet for pain not controlled with the over the counter medication. PAXIL 20 mg tablet Generic drug:  PARoxetine Take  by mouth daily. TOPAMAX 50 mg tablet Generic drug:  topiramate Take  by mouth two (2) times a day. vitamin e 1,000 unit capsule Commonly known as:  E GEMS Take 1,000 Units by mouth daily. We Performed the Following AMB POC XRAY, SPINE, LUMBOSACRAL; 2 O [17932 CPT(R)] Follow-up Instructions Return if symptoms worsen or fail to improve. Introducing [facility]! Dear Rickie Jacobs: Thank you for requesting a [employer] account. Our records indicate that you already have an active S[employer] account. You can access your account anytime at [email or web address] Did you know that you can access your hospital and ER discharge instructions at any time in [employer]? You can also review all of your test results from your hospital stay or ER visit. Additional Information If you have questions, please visit the Frequently Asked Questions section of the S[employer] website at [email or web address] Remember, S[employer]raft is NOT to be used for urgent needs. For medical emergencies, dial 911. Now available from your iPhone and Android! Please provide this summary of care documentation to your next provider. If you have any questions after today's visit, please call [phone].

## 2018-07-17 NOTE — PROGRESS NOTES
[address]  [place]  Phone: [phone]  Fax: [phone]        Madi Veliz  : 1955  PCP: No primary care provider on file. 2018    PROGRESS NOTE      ASSESSMENT AND PLAN    Kiana Irizarry comes in to the office today for lower back pain. She fell recently trying to help a kid that was seizing. The pain radiates down her LLE to the anterior portion of her thigh, as well as the lateral portion of her tibia. She states that \"just her toes\" of her right foot tingles. She denies the top of her R foot tingling. She also states she has back pain, and notes that she has had previous back sx. She states that she can bend forward, but has difficulty standing straight up. Pt experiences bilateral palpations to L4 and below, as well as, SI joint. She has prema incisional pain / post-surgical myofascial pain. Her pain is likely post-surgical myofascial pain with trigger points. I discussed with her a potential EMG/NCS of her lower extremities. I advised her to stay within her comfort zone, in regards to physical activity, and slowly expand what she can do. Pt will f/u in as needed. Diagnoses and all orders for this visit:    1. Myofascial pain    2. Lumbar pain  -     [[id number]] L/S 2-3 views    3. S/P lumbar fusion         Follow-up Disposition: Not on File      HISTORY OF PRESENT ILLNESS  Kiana Alvarez Has is a 58 y.o. female. A&P / HPI from 2016:  Jamie Pederson was in the office for a lumbar MRI f/u. Her pain is likely due to facet syndrome and myofascial pain. Pt was informed about the theracane. She will be referred to pain management to be evaluated for median branch neurotomy. The risks, benefits, and potential side effects of this procedure were discussed.       Pt was prescribed diclofenac 75mg BID prn. The risks, benefits, and potential side effects of this medication were discussed.       PT for potential pelvic floor dysfunction was discussed but she said she would like to wait.      She will f/u in 3 months, or prn.       Updates from 04/05/17:  Pt presents for a prn f/u. She was previously seen for lumbar pain but returns today because of a new cervical pain that radiates down her right arm into the the first and second digits of her hand. She feels the numbness more in her palmar side. Pt recently completed a steroid pack which provided great relief for her symptoms. Her pain/symptoms are likely due to a cervical radiculopathy. There is also a component of myofascial pain.      She reminds me she has had a C4-5 fusion.      Updates from 04/28/17:  Pt presents for a cervical MRI f/u. The MRI shows moderate to severe central stenosis at C6-7 with moderately severe bilateral foraminal stenosis at the same level. Pt continues to have radiating right arm pain.        Updates from 06/02/17:  Pt presents for bilateral upper ext EMG and medication f/u. The EMG did not show evidence of cervical radiculopathy. Although it showed mild prolongation of the right median nerve across the level of the wrist consistent with early right-sided carpal tunnel syndrome    Updates from 07/17/18:  Pt is here today because she fell down. A child was seizing and she tried to help the child out, causing her to fall. She states that she has a bulging disc and it \"hit her sciatic nerve. \" The pain radiates down her LLE to the anterior portion of her thigh, as well as the lateral portion of her tibia. She states that \"just her toes\" of her right foot tingles. She denies the top of her R foot tingling. She also states she has back pain, and notes that she has had previous back sx. Upon palpations of her lower back, she experiences \"tingling. \"       PAST MEDICAL HISTORY   Past Medical History:   Diagnosis Date    Anemia     Arthritis     knees back     Arthritis     Bronchitis     Chest tightness     Chronic fatigue syndrome  Chronic pain     left knee    Colon polyps     Depression     Diabetes mellitus (HCC)     Elevated cholesterol     Heart disease     Heart murmur     Heart murmur     History of echocardiogram 04/17/2012    EF 60-65%. Mild conc LVH. RVSP 15-20. No significant valvular heart disease.  Hypercholesterolemia     Left knee pain     Lumbar radicular pain     Microscopic hematuria     Migraine headache     Milk intolerance     Palpitations     Renal cyst     YEE (stress urinary incontinence), male     Swelling     Syncope     Vitamin D deficiency        Past Surgical History:   Procedure Laterality Date    HX COLONOSCOPY  2/18/2014    Polyp (5mm) in the proximal sigmoid colon. (polypectomy)    HX COLONOSCOPY  04/07/2009    HX COLONOSCOPY  4/10/2006    HX HYSTERECTOMY  1989    HX KNEE ARTHROSCOPY      HX KNEE ARTHROSCOPY      HX KNEE REPLACEMENT  03/2013    left knee replacement    HX LUMBAR FUSION      L5-S1    HX MENISCECTOMY      HX ORTHOPAEDIC      neck fusion  c4 c5    HX ORTHOPAEDIC      arthroscopy left knee    HX OTHER SURGICAL  2009    Colon polyp removed   . MEDICATIONS    Current Outpatient Prescriptions   Medication Sig Dispense Refill    docusate sodium (DOK) 250 mg capsule Take 250 mg by mouth daily.  HYDROcodone-acetaminophen (NORCO) 5-325 mg per tablet Take  by mouth.  albuterol (PROVENTIL HFA, VENTOLIN HFA, PROAIR HFA) 90 mcg/actuation inhaler Take  by inhalation.  fluticasone (FLONASE ALLERGY RELIEF) 50 mcg/actuation nasal spray 2 Sprays by Both Nostrils route daily.  glimepiride (AMARYL) 2 mg tablet Take  by mouth every morning.  ibuprofen (MOTRIN) 800 mg tablet Take  by mouth.  mirabegron ER (MYRBETRIQ) 50 mg ER tablet Take 1 Tab by mouth daily. Indications: URINARY URGE INCONTINENCE 90 Tab 3    cyclobenzaprine (FLEXERIL) 10 mg tablet Take 1 Tab by mouth three (3) times daily as needed for Muscle Spasm(s).  15 Tab 0    aspirin 81 mg chewable tablet Take 81 mg by mouth daily.  vitamin e (E GEMS) 1,000 unit capsule Take 1,000 Units by mouth daily.  esomeprazole (NEXIUM) 40 mg capsule       cholecalciferol, vitamin D3, 2,000 unit tab Take  by mouth.  cyanocobalamin (VITAMIN B12) 500 mcg tablet Take 500 mcg by mouth daily.  atorvastatin (LIPITOR) 40 mg tablet Take  by mouth daily.  losartan (COZAAR) 100 mg tablet Take 100 mg by mouth daily.  PARoxetine (PAXIL) 20 mg tablet Take  by mouth daily.  topiramate (TOPAMAX) 50 mg tablet Take  by mouth two (2) times a day.  gabapentin (NEURONTIN) 300 mg capsule Take 2 Caps by mouth three (3) times daily. (Patient not taking: Reported on 7/17/2018) 180 Cap 2    oxyCODONE-acetaminophen (PERCOCET) 5-325 mg per tablet Take 1 tablet every 4-6 hours as needed for pain control. If you were instructed to try over the counter ibuprofen or tylenol, only take the percocet for pain not controlled with the over the counter medication. (Patient not taking: Reported on 7/17/2018) 12 Tab 0    montelukast (SINGULAIR) 10 mg tablet Take 10 mg by mouth daily.  metoclopramide HCl (REGLAN) 5 mg tablet Take 5 mg by mouth Before breakfast, lunch, and dinner.  mometasone (NASONEX) 50 mcg/actuation nasal spray 2 Sprays daily.           ALLERGIES  Allergies   Allergen Reactions    Calcium Iodide Unproven on Challenge     Pt. denies    Nickel Itching    Pneumovax 23 [Pneumococcal 23-Amita Ps Vaccine] Unknown (comments)    Shellfish Containing Products Swelling          SOCIAL HISTORY    Social History     Social History    Marital status:      Spouse name: N/A    Number of children: N/A    Years of education: N/A     Social History Main Topics    Smoking status: Former Smoker     Types: Cigarettes     Quit date: 1992    Smokeless tobacco: Never Used      Comment: Quit in 1992, smoked 1 pack a week    Alcohol use No    Drug use: No    Sexual activity: Not Currently     Other Topics Concern    None     Social History Narrative       FAMILY HISTORY  Family History   Problem Relation Age of Onset    Diabetes Mother     Heart Surgery Father     Cancer Father     Hypertension Sister     Diabetes Brother     High Cholesterol Sister     Diabetes Sister     Breast Cancer Sister     HIV/AIDS Brother     Breast Cancer Other     Ovarian Cancer Maternal Aunt          REVIEW OF SYSTEMS  Review of Systems   Musculoskeletal: Positive for back pain and myalgias. PHYSICAL EXAMINATION  Visit Vitals    /82    Pulse 86    Temp 98.6 °F (37 °C)    Resp 16    Ht 5' 7\" (1.702 m)    Wt 234 lb 12.8 oz (106.5 kg)    SpO2 94%    BMI 36.77 kg/m2       Pain Assessment  7/17/2018   Location of Pain Back; Foot   Location Modifiers Right   Severity of Pain 5   Quality of Pain Aching   Quality of Pain Comment tingling   Duration of Pain Persistent   Frequency of Pain Constant   Limiting Behavior Some   Relieving Factors Nothing   Result of Injury No           Constitutional:  Well developed, well nourished, in no acute distress. Psychiatric: Affect and mood are appropriate. Integumentary: No rashes or abrasions noted on exposed areas.         SPINE/MUSCULOSKELETAL EXAM    Cervical spine:  Neck is midline.    Normal muscle tone.    No focal atrophy is noted.    ROM pain free.    Shoulder ROM intact.    Mild tenderness to palpation.    Negative Spurling's sign.    Negative Tinel's sign.    Negative De Leon's sign.       Sensation in the bilateral arms grossly intact to light touch.        Lumbar spine:  No rash, ecchymosis, or gross obliquity.    No fasciculations.    No focal atrophy is noted.    No pain with hip ROM.    Range of motion is normal.    Diffuse Tenderness to palpation, lumbar paraspinal muscles.    No tenderness to palpation at the sciatic notch.    SI joints non-tender.    Trochanters non tender.      Sensation in the bilateral legs grossly intact to light touch. MOTOR:      Biceps  Triceps Deltoids Wrist Ext Wrist Flex Hand Intrin   Right 5/5 5/5 5/5 5/5 5/5 5/5   Left 5/5 5/5 5/5 5/5 5/5 5/5             Hip Flex  Quads Hamstrings Ankle DF EHL Ankle PF   Right 5/5 5/5 5/5 5/5 5/5 5/5   Left 5/5 5/5 5/5 5/5 5/5 5/5   DTRs are 2+ biceps, triceps, brachioradialis, patella, and Achilles.      Negative Straight Leg raise. Squat not tested. No difficulty with tandem gait. Normal heel walk. Normal toe rise.       Ambulation without assistive device. FWB.       RADIOGRAPHS  XRay LUMBAR taken on 7/16/18 personally reviewed with patient:  Intact hardware L5-S1  Mild listhesis L4-5  Mild facet sclerosis  Normal alignment    Bilateral upper ext EMG taken on 05/26/2017 personally reviewed with patient:  NCS/EMG FINDINGS:      · Evaluation of the Left median motor, the Right median motor, the Left ulnar motor, the Right Median 2nd Digit sensory, the Left ulnar sensory, and the Right ulnar sensory nerves were unremarkable. · The Left Median 2nd Digit sensory nerve showed prolonged distal peak latency (3.6 ms).         INTERPRETATION: This was a mildly abnormal nerve conduction EMG study showing it to be some mild prolongation of the right median nerve across the level of the wrist consistent with early right-sided carpal tunnel syndrome. No sign of cervical radiculopathy was identified in the right upper extremity.        Lumbar MRI films from 4/5/2016 personally reviewed with patient:  1. Similar appearing degenerative disc disease L5/S1. 2. Mild multilevel facet arthrosis. No central canal stenosis. No high-grade  foraminal stenosis.     Cervical MRI images taken on 4/17/2017 personally reviewed with patient:  Comparison November 8, 2007      Mild reversal of cervical curvature, centered at C4-C5. Similar interbody fusion  at C5 and C6. No compression fracture or pathologic marrow signal. No  spondylolisthesis. Paraspinous soft tissues unremarkable. Craniocervical  junction remains normal. Spinal cord shows normal signal intensity and  morphology.      C2-C3: No disc herniation or central stenosis. No foraminal stenosis.     C3-C4: Minimal posterior disc bulge. No cord contact. Mild facet hypertrophy  with mild bilateral foraminal narrowing, stable.      C4-C5: Mild posterior disc bulge, slightly contacting but not compressing spinal  cord. Posterior CSF present. AP canal measures 9 mm, mild central stenosis,  stable. Mild facet hypertrophy with mild bilateral foraminal narrowing, also  stable.      C5-C6: No disc material. No central or foraminal stenosis.     C6-C7: More severe posterior disc bulge, slightly worse than before with mild  cord contact. Facet and ligamentous hypertrophy also present. AP canal measures  6 mm, moderate to severe central stenosis. No cord compression or edema. Moderately severe bilateral foraminal stenosis. Slightly worse.      C7-T1: No disc herniation, central stenosis or cord compression. Mild bilateral  foraminal narrowing from facet hypertrophy.      IMPRESSION  IMPRESSION: Slight interval progression of degenerative disc disease at C6-C7,  the level below fusion, with moderate to severe central stenosis, cord contact  but no cord compression or edema. Moderately severe bilateral foraminal  stenosis. Less severe disease at the level above fusion, C4-C5, stable and as  Above. 35 minutes of face-to-face contact were spent with the patient during today's visit extensively discussing symptoms and treatment plan. All questions were answered. More than half of this visit today was spent on counseling.      Written by Michael Strong as dictated by Kassi Ortiz MD

## 2019-06-25 ENCOUNTER — HOSPITAL ENCOUNTER (OUTPATIENT)
Dept: LAB | Age: 64
Discharge: HOME | End: 2019-06-25

## 2019-06-25 LAB
SENTARA SPECIMEN COL,SENBCF: NORMAL
SENTARA SPECIMEN COL,SENBCF: NORMAL

## 2019-06-25 PROCEDURE — 99001 SPECIMEN HANDLING PT-LAB: CPT

## 2021-01-06 PROBLEM — K21.9 ESOPHAGEAL REFLUX: Status: ACTIVE | Noted: 2017-07-31

## 2021-01-06 PROBLEM — F41.9 ANXIETY AND DEPRESSION: Status: ACTIVE | Noted: 2018-02-15

## 2021-01-06 PROBLEM — I10 ESSENTIAL HYPERTENSION: Status: ACTIVE | Noted: 2017-07-31

## 2021-01-06 PROBLEM — F32.A ANXIETY AND DEPRESSION: Status: ACTIVE | Noted: 2018-02-15

## 2021-01-06 PROBLEM — Z98.1 S/P LUMBAR FUSION: Status: ACTIVE | Noted: 2018-02-16

## 2021-01-06 PROBLEM — G43.909 MIGRAINES: Status: ACTIVE | Noted: 2017-07-31

## 2021-01-06 PROBLEM — E11.9 TYPE 2 DIABETES MELLITUS WITHOUT COMPLICATION, WITHOUT LONG-TERM CURRENT USE OF INSULIN (HCC): Status: ACTIVE | Noted: 2018-02-15

## 2021-01-06 PROBLEM — M51.26 HNP (HERNIATED NUCLEUS PULPOSUS), LUMBAR: Status: ACTIVE | Noted: 2018-02-15

## 2021-01-06 PROBLEM — M81.0 OSTEOPOROSIS: Status: ACTIVE | Noted: 2017-07-31

## 2021-01-06 RX ORDER — BLOOD SUGAR DIAGNOSTIC
100 STRIP MISCELLANEOUS DAILY
COMMUNITY
Start: 2020-11-04

## 2021-01-06 RX ORDER — ASCORBIC ACID 500 MG
500 TABLET ORAL DAILY
COMMUNITY

## 2021-01-06 RX ORDER — INSULIN PUMP SYRINGE, 3 ML
EACH MISCELLANEOUS
COMMUNITY
Start: 2020-11-02

## 2021-01-06 RX ORDER — BUDESONIDE AND FORMOTEROL FUMARATE DIHYDRATE 160; 4.5 UG/1; UG/1
2 AEROSOL RESPIRATORY (INHALATION) 2 TIMES DAILY
COMMUNITY
Start: 2020-12-08 | End: 2021-01-07

## 2021-01-06 RX ORDER — AMLODIPINE BESYLATE 5 MG/1
TABLET ORAL
COMMUNITY
Start: 2020-11-19

## 2021-01-06 RX ORDER — VENLAFAXINE HYDROCHLORIDE 75 MG/1
37.5 CAPSULE, EXTENDED RELEASE ORAL DAILY
COMMUNITY
Start: 2020-12-18

## 2021-01-06 RX ORDER — LANCETS 28 GAUGE
1 EACH MISCELLANEOUS DAILY
COMMUNITY
Start: 2020-11-04

## 2021-01-07 ENCOUNTER — OFFICE VISIT (OUTPATIENT)
Dept: PULMONOLOGY | Age: 66
End: 2021-01-07
Payer: MEDICARE

## 2021-01-07 VITALS
BODY MASS INDEX: 37.67 KG/M2 | WEIGHT: 240 LBS | DIASTOLIC BLOOD PRESSURE: 77 MMHG | HEIGHT: 67 IN | OXYGEN SATURATION: 97 % | RESPIRATION RATE: 18 BRPM | HEART RATE: 76 BPM | TEMPERATURE: 97.9 F | SYSTOLIC BLOOD PRESSURE: 169 MMHG

## 2021-01-07 DIAGNOSIS — E66.9 OBESITY (BMI 30-39.9): ICD-10-CM

## 2021-01-07 DIAGNOSIS — R01.1 HEART MURMUR: ICD-10-CM

## 2021-01-07 DIAGNOSIS — J45.30 MILD PERSISTENT ASTHMA WITHOUT COMPLICATION: Primary | ICD-10-CM

## 2021-01-07 DIAGNOSIS — J45.30 MILD PERSISTENT REACTIVE AIRWAY DISEASE WITHOUT COMPLICATION: ICD-10-CM

## 2021-01-07 DIAGNOSIS — J30.9 ALLERGIC RHINITIS, UNSPECIFIED SEASONALITY, UNSPECIFIED TRIGGER: ICD-10-CM

## 2021-01-07 PROCEDURE — 99205 OFFICE O/P NEW HI 60 MIN: CPT | Performed by: INTERNAL MEDICINE

## 2021-01-07 RX ORDER — FLUTICASONE FUROATE AND VILANTEROL TRIFENATATE 100; 25 UG/1; UG/1
1 POWDER RESPIRATORY (INHALATION) DAILY
Qty: 1 INHALER | Refills: 5 | Status: SHIPPED | OUTPATIENT
Start: 2021-01-07 | End: 2021-02-05 | Stop reason: SDUPTHER

## 2021-01-07 RX ORDER — FLUTICASONE FUROATE AND VILANTEROL TRIFENATATE 100; 25 UG/1; UG/1
1 POWDER RESPIRATORY (INHALATION) DAILY
Qty: 1 INHALER | Refills: 0 | Status: SHIPPED | COMMUNITY
Start: 2021-01-07 | End: 2021-02-05 | Stop reason: SDUPTHER

## 2021-01-07 NOTE — PROGRESS NOTES
HISTORY OF PRESENT ILLNESS  Kiana Parmar is a 72 y.o. female referred for management of Asthma. Pt with a long history of Asthma, previously well controlled until March 2020 when pt complained of sudden on SOB and wheezing, diagnosed with Asthma exacerbation in the ED and discharged home after nebulized meds, on Prednisone taper. She has been seen in ED for Asthma X 2 over the past 12 months. Pt was started on Pulmicort with good response however this was changed to Symbicort for formulary reasons. Pt reports better control with Pulmicort compared to Symbicort. Despite this, pt rarely uses rescue inhaler. Triggers include dust and prolonged laughing as well as strong perfumes. She denies chest pain, fever, productive cough, pedal edema, orthopnea or PND. Pt is an ex smoker who quit in the 90's. She works as a school nurse. She demonstrated good inhaler technique. Review of Systems   Constitutional: Negative for chills, diaphoresis, fever, malaise/fatigue and weight loss. HENT: Negative for congestion, ear discharge, ear pain, hearing loss, nosebleeds, sinus pain, sore throat and tinnitus. Eyes: Negative for blurred vision, double vision, photophobia, pain, discharge and redness. Respiratory: Positive for cough, shortness of breath and wheezing. Negative for hemoptysis, sputum production and stridor. Cardiovascular: Negative for chest pain, palpitations, orthopnea, claudication, leg swelling and PND. Gastrointestinal: Negative for abdominal pain, blood in stool, constipation, diarrhea, heartburn, melena, nausea and vomiting. Genitourinary: Negative for dysuria, flank pain, frequency, hematuria and urgency. Musculoskeletal: Negative for back pain, falls, joint pain, myalgias and neck pain. Skin: Negative for itching and rash. Neurological: Negative for dizziness, tingling, tremors, sensory change, speech change, focal weakness, seizures, loss of consciousness, weakness and headaches. Endo/Heme/Allergies: Negative for environmental allergies and polydipsia. Does not bruise/bleed easily. Psychiatric/Behavioral: Negative for depression, hallucinations, memory loss, substance abuse and suicidal ideas. The patient is not nervous/anxious and does not have insomnia. Past Medical History:   Diagnosis Date    Anemia     Arthritis     knees back     Arthritis     Bronchitis     Chest tightness     Chronic fatigue syndrome     Chronic pain     left knee    Colon polyps     Depression     Diabetes mellitus (HCC)     Elevated cholesterol     Heart disease     Heart murmur     Heart murmur     History of echocardiogram 04/17/2012    EF 60-65%. Mild conc LVH. RVSP 15-20. No significant valvular heart disease.  Hypercholesterolemia     Left knee pain     Lumbar radicular pain     Microscopic hematuria     Migraine headache     Milk intolerance     Palpitations     Renal cyst     YEE (stress urinary incontinence), male     Swelling     Syncope     Vitamin D deficiency      Past Surgical History:   Procedure Laterality Date    HX COLONOSCOPY  2/18/2014    Polyp (5mm) in the proximal sigmoid colon. (polypectomy)    HX COLONOSCOPY  04/07/2009    HX COLONOSCOPY  4/10/2006    HX HYSTERECTOMY  1989    HX KNEE ARTHROSCOPY      HX KNEE ARTHROSCOPY      HX KNEE REPLACEMENT  03/2013    left knee replacement    HX LUMBAR FUSION      L5-S1    HX MENISCECTOMY      HX ORTHOPAEDIC      neck fusion  c4 c5    HX ORTHOPAEDIC      arthroscopy left knee    HX OTHER SURGICAL  2009    Colon polyp removed     Current Outpatient Medications on File Prior to Visit   Medication Sig Dispense Refill    amLODIPine (NORVASC) 5 mg tablet TAKE 1 TABLET DAILY      glucose blood VI test strips (Precision Xtra Test) strip 100 Each daily.  Blood-Glucose Meter monitoring kit E11.9 test once a day      lancets 28 gauge misc 1 Units daily.       venlafaxine-SR (EFFEXOR-XR) 75 mg capsule Take 75 mg by mouth daily.  ascorbic acid, vitamin C, (VITAMIN C) 500 mg tablet Take 500 mg by mouth daily.  albuterol (PROVENTIL HFA, VENTOLIN HFA, PROAIR HFA) 90 mcg/actuation inhaler Take 2 Puffs by inhalation every six (6) hours as needed.  fluticasone (FLONASE ALLERGY RELIEF) 50 mcg/actuation nasal spray 2 Sprays by Both Nostrils route daily.  glimepiride (AMARYL) 2 mg tablet Take  by mouth every morning.  ibuprofen (MOTRIN) 800 mg tablet Take 800 mg by mouth every eight (8) hours as needed.  aspirin 81 mg chewable tablet Take 81 mg by mouth daily.  vitamin e (E GEMS) 1,000 unit capsule Take 1,000 Units by mouth daily.  esomeprazole (NEXIUM) 40 mg capsule Take 40 mg by mouth daily.  cholecalciferol, vitamin D3, 2,000 unit tab Take 1 Tab by mouth daily.  cyanocobalamin (VITAMIN B12) 500 mcg tablet Take 500 mcg by mouth daily.  atorvastatin (LIPITOR) 40 mg tablet Take 40 mg by mouth daily.  losartan (COZAAR) 100 mg tablet Take 100 mg by mouth daily.  topiramate (TOPAMAX) 50 mg tablet Take 50 mg by mouth two (2) times a day.  mirabegron ER (MYRBETRIQ) 50 mg ER tablet Take 1 Tab by mouth daily. Indications: Urine Leakage When there is a Strong Desire to Void 90 Tab 3    docusate sodium (DOK) 250 mg capsule Take 250 mg by mouth daily.  HYDROcodone-acetaminophen (NORCO) 5-325 mg per tablet Take  by mouth.  gabapentin (NEURONTIN) 300 mg capsule Take 2 Caps by mouth three (3) times daily. (Patient not taking: Reported on 7/17/2018) 180 Cap 2    oxyCODONE-acetaminophen (PERCOCET) 5-325 mg per tablet Take 1 tablet every 4-6 hours as needed for pain control. If you were instructed to try over the counter ibuprofen or tylenol, only take the percocet for pain not controlled with the over the counter medication.  (Patient not taking: Reported on 7/17/2018) 12 Tab 0    cyclobenzaprine (FLEXERIL) 10 mg tablet Take 1 Tab by mouth three (3) times daily as needed for Muscle Spasm(s). 15 Tab 0    montelukast (SINGULAIR) 10 mg tablet Take 10 mg by mouth daily.  metoclopramide HCl (REGLAN) 5 mg tablet Take 5 mg by mouth Before breakfast, lunch, and dinner.  mometasone (NASONEX) 50 mcg/actuation nasal spray 2 Sprays daily.  PARoxetine (PAXIL) 20 mg tablet Take  by mouth daily. No current facility-administered medications on file prior to visit.       Allergies   Allergen Reactions    Calcium Iodide Unproven on Challenge     Pt. denies    Nickel Itching    Pneumovax 23 [Pneumococcal 23-Amita Ps Vaccine] Unknown (comments)    Shellfish Containing Products Swelling     Family History   Problem Relation Age of Onset    Diabetes Mother     Heart Surgery Father     Cancer Father     Hypertension Sister     Diabetes Brother     High Cholesterol Sister     Diabetes Sister     Breast Cancer Sister     HIV/AIDS Brother     Breast Cancer Other     Ovarian Cancer Maternal Aunt      Social History     Socioeconomic History    Marital status:      Spouse name: Not on file    Number of children: Not on file    Years of education: Not on file    Highest education level: Not on file   Occupational History    Not on file   Social Needs    Financial resource strain: Not on file    Food insecurity     Worry: Not on file     Inability: Not on file   [employer] needs     Medical: Not on file     Non-medical: Not on file   Tobacco Use    Smoking status: Former Smoker     Packs/day: 1.00     Years: 17.00     Pack years: 17.00     Types: Cigarettes     Quit date:      Years since quittin.0    Smokeless tobacco: Never Used    Tobacco comment: Quit in , smoked 1 pack a week   Substance and Sexual Activity    Alcohol use: No    Drug use: No    Sexual activity: Not Currently   Lifestyle    Physical activity     Days per week: Not on file     Minutes per session: Not on file    Stress: Not on file   Relationships    Social connections     Talks on phone: Not on file     Gets together: Not on file     Attends Buddhism service: Not on file     Active member of club or organization: Not on file     Attends meetings of clubs or organizations: Not on file     Relationship status: Not on file    Intimate partner violence     Fear of current or ex partner: Not on file     Emotionally abused: Not on file     Physically abused: Not on file     Forced sexual activity: Not on file   Other Topics Concern    Not on file   Social History Narrative    Not on file     Blood pressure (!) 169/77, pulse 76, temperature 97.9 °F (36.6 °C), temperature source Oral, resp. rate 18, height 5' 7\" (1.702 m), weight 108.9 kg (240 lb), SpO2 97 %. Physical Exam  Constitutional:       General: She is not in acute distress. Appearance: She is obese. She is not ill-appearing, toxic-appearing or diaphoretic. HENT:      Head: Normocephalic and atraumatic. Right Ear: External ear normal.      Left Ear: External ear normal.      Nose:      Comments: Deferred      Mouth/Throat:      Comments: Deferred   Eyes:      General:         Right eye: No discharge. Left eye: No discharge. Extraocular Movements: Extraocular movements intact. Conjunctiva/sclera: Conjunctivae normal.      Pupils: Pupils are equal, round, and reactive to light. Neck:      Musculoskeletal: No neck rigidity or muscular tenderness. Vascular: No carotid bruit. Cardiovascular:      Rate and Rhythm: Normal rate and regular rhythm. Pulses: Normal pulses. Heart sounds: Normal heart sounds. No murmur. No gallop. Pulmonary:      Effort: Pulmonary effort is normal. No respiratory distress. Breath sounds: Normal breath sounds. No stridor. No wheezing, rhonchi or rales. Chest:      Chest wall: No tenderness. Abdominal:      Palpations: Abdomen is soft. There is no mass. Tenderness: There is no abdominal tenderness. There is no rebound. Musculoskeletal:         General: No swelling, tenderness, deformity or signs of injury. Right lower leg: No edema. Left lower leg: No edema. Lymphadenopathy:      Cervical: No cervical adenopathy. Skin:     General: Skin is warm and dry. Coloration: Skin is not jaundiced or pale. Findings: No bruising, erythema or lesion. Neurological:      General: No focal deficit present. Mental Status: She is alert and oriented to person, place, and time. Coordination: Coordination normal.   Psychiatric:         Mood and Affect: Mood normal.         Behavior: Behavior normal.         Thought Content: Thought content normal.         Judgment: Judgment normal.         ASSESSMENT and PLAN  Encounter Diagnoses   Name Primary?  Mild persistent asthma without complication Yes    Allergic rhinitis, unspecified seasonality, unspecified trigger     Mild persistent reactive airway disease without complication     Obesity (BMI 30-39. 9)     Heart murmur      Pt with asthma previously well controlled on Pulmicort and less on Symbicort. Could the difference in control be due to delivery/device issues? Will change to Ellipta device (Breo) and monitor response. Sample and inhaler instructions given to pt, Rx sent. Pt also provided with peak flow meter and instructed to monitor PF daily. Trigger avoidance counseling. Healthy weight discussion. RTC 3 months or sooner prn  Pt is up to date with vaccinations.    Total visit time 60 minutes

## 2021-01-07 NOTE — PROGRESS NOTES
Farhan Santos presents today for   Chief Complaint   Patient presents with   [facility] Referral / Consult     referred by Dr. Leia Cross for asthma    Results     COVID (-) 7/14/2020, CXR 12/4/2020 Katie Mccarthy)       Is someone accompanying this pt? No    Is the patient using any DME equipment during OV? No    -DME Company N/A    Depression Screening:  3 most recent PHQ Screens 1/7/2021   Little interest or pleasure in doing things Not at all   Feeling down, depressed, irritable, or hopeless Not at all   Total Score PHQ 2 0       Learning Assessment:  Learning Assessment 1/7/2021   PRIMARY LEARNER Patient   PRIMARY LANGUAGE ENGLISH   LEARNER PREFERENCE PRIMARY DEMONSTRATION     READING     LISTENING     PICTURES     VIDEOS   ANSWERED BY Patient   RELATIONSHIP SELF       Abuse Screening:  Abuse Screening Questionnaire 1/7/2021   Do you ever feel afraid of your partner? N   Are you in a relationship with someone who physically or mentally threatens you? N   Is it safe for you to go home? Y       Fall Risk  Fall Risk Assessment, last 12 mths 1/7/2021   Able to walk? Yes   Fall in past 12 months? 0   Do you feel unsteady? 0   Are you worried about falling 0         Coordination of Care:  1. Have you been to the ER, urgent care clinic since your last visit? Hospitalized since your last visit? Yes; Where: [facility] ED, When: 12/4/2020-Chest pain    2. Have you seen or consulted any other health care providers outside of the [address] [place] [address] since your last visit? Include any pap smears or colon screening. Yes. Dr. Leia Cross, PCP & referring provider    Influenza vaccine received from [facility] on October 2020 per patient. Immunization record updated.

## 2021-02-05 ENCOUNTER — TELEPHONE (OUTPATIENT)
Dept: PULMONOLOGY | Age: 66
End: 2021-02-05

## 2021-02-05 RX ORDER — FLUTICASONE FUROATE AND VILANTEROL TRIFENATATE 100; 25 UG/1; UG/1
1 POWDER RESPIRATORY (INHALATION) DAILY
Qty: 3 INHALER | Refills: 1 | Status: SHIPPED | COMMUNITY
Start: 2021-02-05 | End: 2021-06-02

## 2021-02-10 ENCOUNTER — HOSPITAL ENCOUNTER (OUTPATIENT)
Dept: NON INVASIVE DIAGNOSTICS | Age: 66
Discharge: HOME | End: 2021-02-10
Attending: INTERNAL MEDICINE
Payer: MEDICARE

## 2021-02-10 VITALS
BODY MASS INDEX: 37.67 KG/M2 | DIASTOLIC BLOOD PRESSURE: 77 MMHG | HEIGHT: 67 IN | SYSTOLIC BLOOD PRESSURE: 169 MMHG | WEIGHT: 240 LBS

## 2021-02-10 DIAGNOSIS — R01.1 HEART MURMUR: ICD-10-CM

## 2021-02-10 LAB
ECHO AO ROOT DIAM: 2.52 CM
ECHO LA AREA 4C: 15.16 CM2
ECHO LA VOL 2C: 33.08 ML (ref 22–52)
ECHO LA VOL 4C: 38.51 ML (ref 22–52)
ECHO LA VOL BP: 39.86 ML (ref 22–52)
ECHO LA VOL/BSA BIPLANE: 18.24 ML/M2 (ref 16–28)
ECHO LA VOLUME INDEX A2C: 15.14 ML/M2 (ref 16–28)
ECHO LA VOLUME INDEX A4C: 17.62 ML/M2 (ref 16–28)
ECHO LV INTERNAL DIMENSION DIASTOLIC: 3.37 CM (ref 3.9–5.3)
ECHO LV INTERNAL DIMENSION SYSTOLIC: 2.29 CM
ECHO LV IVSD: 1.32 CM (ref 0.6–0.9)
ECHO LV MASS 2D: 151.2 G (ref 67–162)
ECHO LV MASS INDEX 2D: 69.2 G/M2 (ref 43–95)
ECHO LV POSTERIOR WALL DIASTOLIC: 1.34 CM (ref 0.6–0.9)
ECHO LVOT DIAM: 1.81 CM
ECHO LVOT PEAK GRADIENT: 4.99 MMHG
ECHO LVOT PEAK VELOCITY: 111.66 CM/S
ECHO LVOT SV: 53.3 ML
ECHO LVOT VTI: 20.66 CM
ECHO MV A VELOCITY: 97.12 CM/S
ECHO MV E DECELERATION TIME (DT): 176.16 MS
ECHO MV E VELOCITY: 80.62 CM/S
ECHO MV E/A RATIO: 0.83
ECHO RV INTERNAL DIMENSION: 3 CM
LVOT MG: 2.71 MMHG

## 2021-02-10 PROCEDURE — 93306 TTE W/DOPPLER COMPLETE: CPT

## 2021-06-02 RX ORDER — FLUTICASONE FUROATE AND VILANTEROL TRIFENATATE 100; 25 UG/1; UG/1
POWDER RESPIRATORY (INHALATION)
Qty: 3 INHALER | Refills: 3 | Status: SHIPPED | OUTPATIENT
Start: 2021-06-02 | End: 2021-07-08 | Stop reason: SDUPTHER

## 2021-06-02 RX ORDER — FLUTICASONE FUROATE AND VILANTEROL TRIFENATATE 100; 25 UG/1; UG/1
1 POWDER RESPIRATORY (INHALATION) DAILY
Qty: 3 INHALER | Refills: 1 | OUTPATIENT
Start: 2021-06-02

## 2021-06-17 ENCOUNTER — OFFICE VISIT (OUTPATIENT)
Dept: PULMONOLOGY | Age: 66
End: 2021-06-17
Payer: MEDICARE

## 2021-06-17 VITALS
WEIGHT: 242.4 LBS | BODY MASS INDEX: 38.04 KG/M2 | TEMPERATURE: 97.8 F | OXYGEN SATURATION: 94 % | HEART RATE: 99 BPM | HEIGHT: 67 IN | RESPIRATION RATE: 16 BRPM | SYSTOLIC BLOOD PRESSURE: 151 MMHG | DIASTOLIC BLOOD PRESSURE: 80 MMHG

## 2021-06-17 DIAGNOSIS — E66.9 OBESITY (BMI 30-39.9): ICD-10-CM

## 2021-06-17 DIAGNOSIS — J30.9 ALLERGIC RHINITIS, UNSPECIFIED SEASONALITY, UNSPECIFIED TRIGGER: ICD-10-CM

## 2021-06-17 DIAGNOSIS — J45.21 MILD INTERMITTENT ASTHMA WITH ACUTE EXACERBATION: Primary | ICD-10-CM

## 2021-06-17 PROCEDURE — G8417 CALC BMI ABV UP PARAM F/U: HCPCS | Performed by: INTERNAL MEDICINE

## 2021-06-17 PROCEDURE — G8754 DIAS BP LESS 90: HCPCS | Performed by: INTERNAL MEDICINE

## 2021-06-17 PROCEDURE — 1090F PRES/ABSN URINE INCON ASSESS: CPT | Performed by: INTERNAL MEDICINE

## 2021-06-17 PROCEDURE — 3017F COLORECTAL CA SCREEN DOC REV: CPT | Performed by: INTERNAL MEDICINE

## 2021-06-17 PROCEDURE — G8536 NO DOC ELDER MAL SCRN: HCPCS | Performed by: INTERNAL MEDICINE

## 2021-06-17 PROCEDURE — G9717 DOC PT DX DEP/BP F/U NT REQ: HCPCS | Performed by: INTERNAL MEDICINE

## 2021-06-17 PROCEDURE — 1101F PT FALLS ASSESS-DOCD LE1/YR: CPT | Performed by: INTERNAL MEDICINE

## 2021-06-17 PROCEDURE — G8753 SYS BP > OR = 140: HCPCS | Performed by: INTERNAL MEDICINE

## 2021-06-17 PROCEDURE — 99214 OFFICE O/P EST MOD 30 MIN: CPT | Performed by: INTERNAL MEDICINE

## 2021-06-17 PROCEDURE — G8427 DOCREV CUR MEDS BY ELIG CLIN: HCPCS | Performed by: INTERNAL MEDICINE

## 2021-06-17 NOTE — PROGRESS NOTES
HISTORY OF PRESENT ILLNESS  Kiana Moralez is a 72 y.o. female referred for management of Asthma. Pt with a long history of Asthma, previously well controlled until March 2020 when pt complained of sudden on SOB and wheezing, diagnosed with Asthma exacerbation in the ED and discharged home after nebulized meds, on Prednisone taper. She has been seen in ED for Asthma X 2 over the past 12 months. Pt was started on Pulmicort with good response however this was changed to Symbicort for formulary reasons. Pt reported better control with Pulmicort compared to Symbicort. She was then switched to [facility] on the last visit with some improvement in control however pt still used Albuterol once or twice a week baseline. Last week, pt noted sore throat, cough and shortness of breath with subjective fevers. She also had increased wheezing but no myalgias. She thinks she had \"a virus\" but was not tested for respiratory viral panel or COVID 19. She stayed home and is feeling better today. Peak flow was decreased last week but normal currently. She denies chest pain, fever, productive cough, pedal edema, orthopnea or PND. Other symptoms include nasal congestion and rhinorrhea for which she uses nasal steroid spray. Pt is an ex smoker who quit in the 90's. She works as a school nurse. She demonstrated good inhaler technique. Review of Systems   Constitutional: Negative for chills, diaphoresis, fever, malaise/fatigue and weight loss. HENT: Positive for congestion. Negative for ear discharge, ear pain, hearing loss, nosebleeds, sinus pain, sore throat and tinnitus. Eyes: Negative for blurred vision, double vision, photophobia, pain, discharge and redness. Respiratory: Positive for cough, shortness of breath and wheezing. Negative for hemoptysis, sputum production and stridor. Cardiovascular: Negative for chest pain, palpitations, orthopnea, claudication, leg swelling and PND.    Gastrointestinal: Negative for abdominal pain, blood in stool, constipation, diarrhea, heartburn, melena, nausea and vomiting. Genitourinary: Negative for dysuria, flank pain, frequency, hematuria and urgency. Musculoskeletal: Negative for back pain, falls, joint pain, myalgias and neck pain. Skin: Negative for itching and rash. Neurological: Negative for dizziness, tingling, tremors, sensory change, speech change, focal weakness, seizures, loss of consciousness and weakness. Endo/Heme/Allergies: Negative for environmental allergies and polydipsia. Does not bruise/bleed easily. Psychiatric/Behavioral: Negative for depression, hallucinations, memory loss, substance abuse and suicidal ideas. The patient is not nervous/anxious and does not have insomnia. Past Medical History:   Diagnosis Date    Anemia     Arthritis     knees back     Arthritis     Bronchitis     Chest tightness     Chronic fatigue syndrome     Chronic pain     left knee    Colon polyps     Depression     Diabetes mellitus (HCC)     Elevated cholesterol     Heart disease     Heart murmur     Heart murmur     History of echocardiogram 04/17/2012    EF 60-65%. Mild conc LVH. RVSP 15-20. No significant valvular heart disease.     Hypercholesterolemia     Left knee pain     Lumbar radicular pain     Microscopic hematuria     Migraine headache     Milk intolerance     Palpitations     Renal cyst     YEE (stress urinary incontinence), male     Swelling     Syncope     Vitamin D deficiency      Past Surgical History:   Procedure Laterality Date    HX COLONOSCOPY  2/18/2014    Polyp (5mm) in the proximal sigmoid colon. (polypectomy)    HX COLONOSCOPY  04/07/2009    HX COLONOSCOPY  4/10/2006    HX HYSTERECTOMY  1989    HX KNEE ARTHROSCOPY      HX KNEE ARTHROSCOPY      HX KNEE REPLACEMENT  03/2013    left knee replacement    HX LUMBAR FUSION      L5-S1    HX MENISCECTOMY      HX ORTHOPAEDIC      neck fusion  c4 c5    HX ORTHOPAEDIC arthroscopy left knee    HX OTHER SURGICAL  2009    Colon polyp removed     Current Outpatient Medications on File Prior to Visit   Medication Sig Dispense Refill    Breo Ellipta 100-25 mcg/dose inhaler USE 1 INHALATION BY MOUTH  DAILY 3 Inhaler 3    amLODIPine (NORVASC) 5 mg tablet TAKE 1 TABLET DAILY      venlafaxine-SR (EFFEXOR-XR) 75 mg capsule Take 75 mg by mouth daily.  ascorbic acid, vitamin C, (VITAMIN C) 500 mg tablet Take 500 mg by mouth daily.  albuterol (PROVENTIL HFA, VENTOLIN HFA, PROAIR HFA) 90 mcg/actuation inhaler Take 2 Puffs by inhalation every six (6) hours as needed.  fluticasone (FLONASE ALLERGY RELIEF) 50 mcg/actuation nasal spray 2 Sprays by Both Nostrils route daily.  glimepiride (AMARYL) 2 mg tablet Take  by mouth every morning.  ibuprofen (MOTRIN) 800 mg tablet Take 800 mg by mouth every eight (8) hours as needed.  aspirin 81 mg chewable tablet Take 81 mg by mouth daily.  vitamin e (E GEMS) 1,000 unit capsule Take 1,000 Units by mouth daily.  esomeprazole (NEXIUM) 40 mg capsule Take 40 mg by mouth daily.  cholecalciferol, vitamin D3, 2,000 unit tab Take 1 Tab by mouth daily.  atorvastatin (LIPITOR) 40 mg tablet Take 40 mg by mouth daily.  losartan (COZAAR) 100 mg tablet Take 100 mg by mouth daily.  glucose blood VI test strips (Precision Xtra Test) strip 100 Each daily. (Patient not taking: Reported on 6/17/2021)      Blood-Glucose Meter monitoring kit E11.9 test once a day (Patient not taking: Reported on 6/17/2021)      lancets 28 gauge misc 1 Units daily. (Patient not taking: Reported on 6/17/2021)      mirabegron ER (MYRBETRIQ) 50 mg ER tablet Take 1 Tab by mouth daily. Indications: Urine Leakage When there is a Strong Desire to Void (Patient not taking: Reported on 6/17/2021) 90 Tab 3    gabapentin (NEURONTIN) 300 mg capsule Take 2 Caps by mouth three (3) times daily.  (Patient not taking: Reported on 7/17/2018) 180 Cap 2    oxyCODONE-acetaminophen (PERCOCET) 5-325 mg per tablet Take 1 tablet every 4-6 hours as needed for pain control. If you were instructed to try over the counter ibuprofen or tylenol, only take the percocet for pain not controlled with the over the counter medication. (Patient not taking: Reported on 2018) 12 Tab 0    cyclobenzaprine (FLEXERIL) 10 mg tablet Take 1 Tab by mouth three (3) times daily as needed for Muscle Spasm(s). (Patient not taking: Reported on 2021) 15 Tab 0    cyanocobalamin (VITAMIN B12) 500 mcg tablet Take 500 mcg by mouth daily. (Patient not taking: Reported on 2021)      topiramate (TOPAMAX) 50 mg tablet Take 50 mg by mouth two (2) times a day. (Patient not taking: Reported on 2021)       No current facility-administered medications on file prior to visit.      Allergies   Allergen Reactions    Calcium Iodide Unproven on Challenge     Pt. denies    Nickel Itching    Pneumovax 23 [Pneumococcal 23-Amita Ps Vaccine] Unknown (comments)    Shellfish Containing Products Swelling     Family History   Problem Relation Age of Onset    Diabetes Mother     Heart Surgery Father     Cancer Father     Hypertension Sister     Diabetes Brother     High Cholesterol Sister     Diabetes Sister     Breast Cancer Sister     HIV/AIDS Brother     Breast Cancer Other     Ovarian Cancer Maternal Aunt      Social History     Socioeconomic History    Marital status:      Spouse name: Not on file    Number of children: Not on file    Years of education: Not on file    Highest education level: Not on file   Occupational History    Not on file   Tobacco Use    Smoking status: Former Smoker     Packs/day: 1.00     Years: 17.00     Pack years: 17.00     Types: Cigarettes     Quit date:      Years since quittin.4    Smokeless tobacco: Never Used    Tobacco comment: Quit in , smoked 1 pack a week   Vaping Use    Vaping Use: Never used   Substance and Sexual Activity    Alcohol use: No    Drug use: No    Sexual activity: Not Currently   Other Topics Concern    Not on file   Social History Narrative    Not on file     Social Determinants of Health     Financial Resource Strain:     Difficulty of Paying Living Expenses:    Food Insecurity:     Worried About Running Out of Food in the Last Year:     [address] in the Last Year:    Transportation Needs:     Lack of Transportation (Medical):  Lack of Transportation (Non-Medical):    Physical Activity:     Days of Exercise per Week:     Minutes of Exercise per Session:    Stress:     Feeling of Stress :    Social Connections:     Frequency of Communication with Friends and Family:     Frequency of Social Gatherings with Friends and Family:     Attends Taoist Services:     Active Member of Clubs or Organizations:     Attends Club or Organization Meetings:     Marital Status:    Intimate Partner Violence:     Fear of Current or Ex-Partner:     Emotionally Abused:     Physically Abused:     Sexually Abused:      Blood pressure (!) 151/80, pulse 99, temperature 97.8 °F (36.6 °C), temperature source Oral, resp. rate 16, height 5' 7\" (1.702 m), weight 110 kg (242 lb 6.4 oz), SpO2 94 %. Physical Exam  Constitutional:       General: She is not in acute distress. Appearance: She is obese. She is not ill-appearing, toxic-appearing or diaphoretic. HENT:      Head: Normocephalic and atraumatic. Right Ear: External ear normal.      Left Ear: External ear normal.      Nose:      Comments: Deferred      Mouth/Throat:      Comments: Deferred   Eyes:      General:         Right eye: No discharge. Left eye: No discharge. Extraocular Movements: Extraocular movements intact. Conjunctiva/sclera: Conjunctivae normal.      Pupils: Pupils are equal, round, and reactive to light. Neck:      Vascular: No carotid bruit.    Cardiovascular:      Rate and Rhythm: Normal rate and regular rhythm. Pulses: Normal pulses. Heart sounds: Murmur (3/6 systolic murmur unchanged) heard. No gallop. Pulmonary:      Effort: Pulmonary effort is normal. No respiratory distress. Breath sounds: Normal breath sounds. No stridor. No wheezing, rhonchi or rales. Chest:      Chest wall: No tenderness. Abdominal:      Palpations: Abdomen is soft. There is no mass. Tenderness: There is no abdominal tenderness. There is no rebound. Musculoskeletal:         General: No swelling, tenderness, deformity or signs of injury. Cervical back: No rigidity. No muscular tenderness. Right lower leg: No edema. Left lower leg: No edema. Lymphadenopathy:      Cervical: No cervical adenopathy. Skin:     General: Skin is warm and dry. Coloration: Skin is not jaundiced or pale. Findings: No bruising, erythema or lesion. Neurological:      General: No focal deficit present. Mental Status: She is alert and oriented to person, place, and time. Coordination: Coordination normal.   Psychiatric:         Mood and Affect: Mood normal.         Behavior: Behavior normal.         Thought Content: Thought content normal.         Judgment: Judgment normal.         ASSESSMENT and PLAN  Encounter Diagnoses   Name Primary?  Mild intermittent asthma with acute exacerbation Yes    Allergic rhinitis, unspecified seasonality, unspecified trigger     Obesity (BMI 30-39. 9)      Pt with asthma in exacerbation but now improving, and likely triggered by an upper respiratory viral infection. As pt is almost at baseline, would hold off on systemic steroids as risks of steroids outweigh potential benefits. Pt to continue Breo and prn Albuterol   Pt to call if symptoms of exacerbation persist.  Counseled on trigger avoidance  Healthy weight discussion.   RTC 6 months  Ligia Matos on return visit

## 2021-06-17 NOTE — PROGRESS NOTES
Moon Ordonez presents today for   Chief Complaint   Patient presents with    Asthma     Restrictive lung disease. Echo 2/10/2021.  Allergic Rhinitis    Cough     Productive cough with yellow sputum. Pt states it drains out of her nose and eyes as well. Is someone accompanying this pt? No    Is the patient using any DME equipment during OV? No    -DME Company NA    Depression Screening:  3 most recent PHQ Screens 1/7/2021   Little interest or pleasure in doing things Not at all   Feeling down, depressed, irritable, or hopeless Not at all   Total Score PHQ 2 0       Learning Assessment:  Learning Assessment 1/7/2021   PRIMARY LEARNER Patient   PRIMARY LANGUAGE ENGLISH   LEARNER PREFERENCE PRIMARY DEMONSTRATION     READING     LISTENING     PICTURES     VIDEOS   ANSWERED BY Patient   RELATIONSHIP SELF       Abuse Screening:  Abuse Screening Questionnaire 1/7/2021   Do you ever feel afraid of your partner? N   Are you in a relationship with someone who physically or mentally threatens you? N   Is it safe for you to go home? Y       Fall Risk  Fall Risk Assessment, last 12 mths 6/17/2021   Able to walk? Yes   Fall in past 12 months? 0   Do you feel unsteady? 0   Are you worried about falling 0         Coordination of Care:  1. Have you been to the ER, urgent care clinic since your last visit? Hospitalized since your last visit? No    2. Have you seen or consulted any other health care providers outside of the [address] [place] [address] since your last visit? Include any pap smears or colon screening.  No.

## 2021-07-08 RX ORDER — FLUTICASONE FUROATE AND VILANTEROL TRIFENATATE 100; 25 UG/1; UG/1
POWDER RESPIRATORY (INHALATION)
Qty: 3 INHALER | Refills: 3 | Status: SHIPPED | OUTPATIENT
Start: 2021-07-08 | End: 2022-07-29 | Stop reason: SDUPTHER

## 2021-07-08 NOTE — TELEPHONE ENCOUNTER
Pt called stating that she never received the Breo from her mail order pharmacy that was sent on 6/2/21 to [facility].  Please advise [phone]

## 2021-07-08 NOTE — TELEPHONE ENCOUNTER
Patient states she is on the phone with the mail order but will need to local since almost out.   [facility] to [facility]

## 2021-08-03 ENCOUNTER — TRANSCRIBE ORDER (OUTPATIENT)
Dept: REGISTRATION | Age: 66
End: 2021-08-03

## 2021-08-03 ENCOUNTER — HOSPITAL ENCOUNTER (OUTPATIENT)
Dept: GENERAL RADIOLOGY | Age: 66
Discharge: HOME | End: 2021-08-03
Payer: MEDICARE

## 2021-08-03 ENCOUNTER — HOSPITAL ENCOUNTER (OUTPATIENT)
Dept: LAB | Age: 66
Discharge: HOME | End: 2021-08-03

## 2021-08-03 ENCOUNTER — HOSPITAL ENCOUNTER (OUTPATIENT)
Dept: LAB | Age: 66
Discharge: HOME | End: 2021-08-03
Payer: MEDICARE

## 2021-08-03 DIAGNOSIS — Z01.818 PRE-OP TESTING: Primary | ICD-10-CM

## 2021-08-03 DIAGNOSIS — Z01.818 PRE-OP TESTING: ICD-10-CM

## 2021-08-03 LAB
ATRIAL RATE: 84 BPM
CALCULATED P AXIS, ECG09: 44 DEGREES
CALCULATED R AXIS, ECG10: -14 DEGREES
CALCULATED T AXIS, ECG11: 72 DEGREES
DIAGNOSIS, 93000: NORMAL
P-R INTERVAL, ECG05: 150 MS
Q-T INTERVAL, ECG07: 348 MS
QRS DURATION, ECG06: 92 MS
QTC CALCULATION (BEZET), ECG08: 411 MS
SENTARA SPECIMEN COL,SENBCF: NORMAL
VENTRICULAR RATE, ECG03: 84 BPM

## 2021-08-03 PROCEDURE — 93005 ELECTROCARDIOGRAM TRACING: CPT

## 2021-08-03 PROCEDURE — 99001 SPECIMEN HANDLING PT-LAB: CPT

## 2021-08-03 PROCEDURE — 71046 X-RAY EXAM CHEST 2 VIEWS: CPT

## 2021-10-13 ENCOUNTER — TELEPHONE (OUTPATIENT)
Dept: PULMONOLOGY | Age: 66
End: 2021-10-13

## 2021-10-13 NOTE — TELEPHONE ENCOUNTER
Spoke with patient. She states she is just now leaving [facility]. She had a rapid covid test done and it was negative. She was given prednisone to assist with the tightness in her chest. Requested patient call me back on Friday 10/15/2021 if she is not feeling any better. Patient verbalized understanding.

## 2021-10-13 NOTE — TELEPHONE ENCOUNTER
Pt called stating that she was having trouble breathing. [facility] appt for [facility] which is not until 12/16/21.  Please advise [phone]

## 2021-12-16 ENCOUNTER — OFFICE VISIT (OUTPATIENT)
Dept: PULMONOLOGY | Age: 66
End: 2021-12-16
Payer: MEDICARE

## 2021-12-16 VITALS
TEMPERATURE: 97.4 F | OXYGEN SATURATION: 98 % | HEART RATE: 88 BPM | WEIGHT: 241 LBS | BODY MASS INDEX: 37.83 KG/M2 | RESPIRATION RATE: 18 BRPM | DIASTOLIC BLOOD PRESSURE: 79 MMHG | SYSTOLIC BLOOD PRESSURE: 154 MMHG | HEIGHT: 67 IN

## 2021-12-16 DIAGNOSIS — J45.21 MILD INTERMITTENT ASTHMA WITH ACUTE EXACERBATION: Primary | ICD-10-CM

## 2021-12-16 DIAGNOSIS — Z87.891 FORMER SMOKER: ICD-10-CM

## 2021-12-16 DIAGNOSIS — E66.9 OBESITY (BMI 30-39.9): ICD-10-CM

## 2021-12-16 PROCEDURE — 99214 OFFICE O/P EST MOD 30 MIN: CPT | Performed by: INTERNAL MEDICINE

## 2021-12-16 PROCEDURE — G9717 DOC PT DX DEP/BP F/U NT REQ: HCPCS | Performed by: INTERNAL MEDICINE

## 2021-12-16 PROCEDURE — 1090F PRES/ABSN URINE INCON ASSESS: CPT | Performed by: INTERNAL MEDICINE

## 2021-12-16 PROCEDURE — G8753 SYS BP > OR = 140: HCPCS | Performed by: INTERNAL MEDICINE

## 2021-12-16 PROCEDURE — G8427 DOCREV CUR MEDS BY ELIG CLIN: HCPCS | Performed by: INTERNAL MEDICINE

## 2021-12-16 PROCEDURE — 3017F COLORECTAL CA SCREEN DOC REV: CPT | Performed by: INTERNAL MEDICINE

## 2021-12-16 PROCEDURE — 1101F PT FALLS ASSESS-DOCD LE1/YR: CPT | Performed by: INTERNAL MEDICINE

## 2021-12-16 PROCEDURE — G8536 NO DOC ELDER MAL SCRN: HCPCS | Performed by: INTERNAL MEDICINE

## 2021-12-16 PROCEDURE — G8754 DIAS BP LESS 90: HCPCS | Performed by: INTERNAL MEDICINE

## 2021-12-16 PROCEDURE — G8417 CALC BMI ABV UP PARAM F/U: HCPCS | Performed by: INTERNAL MEDICINE

## 2021-12-16 RX ORDER — IPRATROPIUM BROMIDE 21 UG/1
2 SPRAY, METERED NASAL EVERY 12 HOURS
COMMUNITY

## 2021-12-16 RX ORDER — AMLODIPINE BESYLATE 2.5 MG/1
TABLET ORAL DAILY
COMMUNITY

## 2021-12-16 RX ORDER — ASCORBIC ACID 250 MG
TABLET ORAL
COMMUNITY

## 2021-12-16 RX ORDER — AZELASTINE 1 MG/ML
1 SPRAY, METERED NASAL 2 TIMES DAILY
COMMUNITY

## 2021-12-16 RX ORDER — MONTELUKAST SODIUM 10 MG/1
10 TABLET ORAL DAILY
COMMUNITY

## 2021-12-16 NOTE — PROGRESS NOTES
Kiana Irizarry presents today for No chief complaint on file. Is someone accompanying this pt? No    Is the patient using any DME equipment during [address]?     -DME Company     Depression Screening:  3 most recent PHQ Screens 1/7/2021   Little interest or pleasure in doing things Not at all   Feeling down, depressed, irritable, or hopeless Not at all   Total Score PHQ 2 0       Learning Assessment:  Learning Assessment 1/7/2021   PRIMARY LEARNER Patient   PRIMARY LANGUAGE ENGLISH   LEARNER PREFERENCE PRIMARY DEMONSTRATION     READING     LISTENING     PICTURES     VIDEOS   ANSWERED BY Patient   RELATIONSHIP SELF       Abuse Screening:  Abuse Screening Questionnaire 1/7/2021   Do you ever feel afraid of your partner? N   Are you in a relationship with someone who physically or mentally threatens you? N   Is it safe for you to go home? Y       Fall Risk  Fall Risk Assessment, last 12 mths 6/17/2021   Able to walk? Yes   Fall in past 12 months? 0   Do you feel unsteady? 0   Are you worried about falling 0         Coordination of Care:  1. Have you been to the ER, urgent care clinic since your last visit? Hospitalized since your last visit? [facility] attack 20021 doesn't remember the month    2. Have you seen or consulted any other health care providers outside of the [address] [place] [address] since your last visit? Include any pap smears or colon screening.  No

## 2021-12-16 NOTE — PROGRESS NOTES
HISTORY OF PRESENT ILLNESS  Bethanie Duval is a 77 y.o. female following up for Asthma  Pt with a long history of Asthma, previously well controlled until March 2020 when pt complained of sudden on SOB and wheezing, diagnosed with Asthma exacerbation in the ED and discharged home after nebulized brohcodilators and Prednisone taper. She has been seen in ED for Asthma X 2 over the past 12 months. Pt was started on Pulmicort with good response however this was changed to Symbicort for formulary reasons. Pt reported better control with Pulmicort compared to Symbicort. She was then switched to [facility] on the last visit with some improvement in control however pt still used Albuterol once or twice a week baseline. Pt was last seen here for asthma exacerbation with increased SOB and wheezing as well as subjective fevers. She thinks she had \"a virus\" but had improved without steroids or antibiotics. She recovered but was then seen in urgent care for another exacerbation which was treated with Prednisone taper. She is back to baseline, with rare use of rescue Albuterol. She uses Breo daily. She currently denies chest pain, fever, productive cough, pedal edema, orthopnea or PND. Other symptoms include nasal congestion and rhinorrhea for which she uses nasal steroid spray. Pt is an ex smoker who quit in the 90's. She works as a school nurse. Review of Systems   Constitutional: Negative for chills, diaphoresis, fever, malaise/fatigue and weight loss. HENT: Positive for congestion. Negative for ear discharge, ear pain, hearing loss, nosebleeds, sinus pain, sore throat and tinnitus. Eyes: Negative for blurred vision, double vision, photophobia, pain, discharge and redness. Respiratory: Positive for cough, shortness of breath and wheezing. Negative for hemoptysis, sputum production and stridor. Cardiovascular: Negative for chest pain, palpitations, orthopnea, claudication, leg swelling and PND. Gastrointestinal: Negative for abdominal pain, blood in stool, constipation, diarrhea, heartburn, melena, nausea and vomiting. Genitourinary: Negative for dysuria, flank pain, frequency, hematuria and urgency. Musculoskeletal: Negative for back pain, falls, joint pain, myalgias and neck pain. Skin: Negative for itching and rash. Neurological: Negative for dizziness, tingling, tremors, sensory change, speech change, focal weakness, seizures, loss of consciousness and weakness. Endo/Heme/Allergies: Negative for environmental allergies and polydipsia. Does not bruise/bleed easily. Psychiatric/Behavioral: Negative for depression, hallucinations, memory loss, substance abuse and suicidal ideas. The patient is not nervous/anxious and does not have insomnia. Past Medical History:   Diagnosis Date    Anemia     Arthritis     knees back     Arthritis     Bronchitis     Chest tightness     Chronic fatigue syndrome     Chronic pain     left knee    Colon polyps     Depression     Diabetes mellitus (HCC)     Elevated cholesterol     Heart disease     Heart murmur     Heart murmur     History of echocardiogram 04/17/2012    EF 60-65%. Mild conc LVH. RVSP 15-20. No significant valvular heart disease.     Hypercholesterolemia     Left knee pain     Lumbar radicular pain     Microscopic hematuria     Migraine headache     Milk intolerance     Palpitations     Renal cyst     YEE (stress urinary incontinence), male     Swelling     Syncope     Vitamin D deficiency      Past Surgical History:   Procedure Laterality Date    HX COLONOSCOPY  2/18/2014    Polyp (5mm) in the proximal sigmoid colon. (polypectomy)    HX COLONOSCOPY  04/07/2009    HX COLONOSCOPY  4/10/2006    HX HYSTERECTOMY  1989    HX KNEE ARTHROSCOPY      HX KNEE ARTHROSCOPY      HX KNEE REPLACEMENT  03/2013    left knee replacement    HX LUMBAR FUSION      L5-S1    HX MENISCECTOMY      HX ORTHOPAEDIC      neck fusion  c4 c5    HX ORTHOPAEDIC      arthroscopy left knee    HX OTHER SURGICAL  2009    Colon polyp removed     Current Outpatient Medications on File Prior to Visit   Medication Sig Dispense Refill    amLODIPine (Norvasc) 2.5 mg tablet Take  by mouth daily.  ascorbic acid, vitamin C, (Vitamin C) 250 mg tablet Take  by mouth.  azelastine (ASTELIN) 137 mcg (0.1 %) nasal spray 1 Spray two (2) times a day. Use in each nostril as directed      ipratropium (ATROVENT) 21 mcg (0.03 %) nasal spray 2 Sprays every twelve (12) hours.  montelukast (Singulair) 10 mg tablet Take 10 mg by mouth daily.  fluticasone furoate-vilanteroL (Breo Ellipta) 100-25 mcg/dose inhaler USE 1 INHALATION BY MOUTH  DAILY 3 Inhaler 3    amLODIPine (NORVASC) 5 mg tablet TAKE 1 TABLET DAILY      venlafaxine-SR (EFFEXOR-XR) 75 mg capsule Take 37.5 mg by mouth daily.  ascorbic acid, vitamin C, (VITAMIN C) 500 mg tablet Take 500 mg by mouth daily.  albuterol (PROVENTIL HFA, VENTOLIN HFA, PROAIR HFA) 90 mcg/actuation inhaler Take 2 Puffs by inhalation every six (6) hours as needed.  fluticasone (FLONASE ALLERGY RELIEF) 50 mcg/actuation nasal spray 2 Sprays by Both Nostrils route daily.  ibuprofen (MOTRIN) 800 mg tablet Take 800 mg by mouth every eight (8) hours as needed.  aspirin 81 mg chewable tablet Take 81 mg by mouth daily.  vitamin e (E GEMS) 1,000 unit capsule Take 1,000 Units by mouth daily.  cholecalciferol, vitamin D3, 2,000 unit tab Take 1 Tab by mouth daily.  atorvastatin (LIPITOR) 40 mg tablet Take 40 mg by mouth daily.  glucose blood VI test strips (Precision Xtra Test) strip 100 Each daily. (Patient not taking: Reported on 6/17/2021)      Blood-Glucose Meter monitoring kit E11.9 test once a day (Patient not taking: Reported on 6/17/2021)      lancets 28 gauge misc 1 Units daily.  (Patient not taking: Reported on 6/17/2021)      mirabegron ER (MYRBETRIQ) 50 mg ER tablet Take 1 Tab by mouth daily. Indications: Urine Leakage When there is a Strong Desire to Void (Patient not taking: Reported on 6/17/2021) 90 Tab 3    glimepiride (AMARYL) 2 mg tablet Take  by mouth every morning.  gabapentin (NEURONTIN) 300 mg capsule Take 2 Caps by mouth three (3) times daily. (Patient not taking: Reported on 7/17/2018) 180 Cap 2    oxyCODONE-acetaminophen (PERCOCET) 5-325 mg per tablet Take 1 tablet every 4-6 hours as needed for pain control. If you were instructed to try over the counter ibuprofen or tylenol, only take the percocet for pain not controlled with the over the counter medication. (Patient not taking: Reported on 7/17/2018) 12 Tab 0    cyclobenzaprine (FLEXERIL) 10 mg tablet Take 1 Tab by mouth three (3) times daily as needed for Muscle Spasm(s). (Patient not taking: Reported on 6/17/2021) 15 Tab 0    esomeprazole (NEXIUM) 40 mg capsule Take 40 mg by mouth daily.  cyanocobalamin (VITAMIN B12) 500 mcg tablet Take 500 mcg by mouth daily. (Patient not taking: Reported on 6/17/2021)      losartan (COZAAR) 100 mg tablet Take 100 mg by mouth daily.  topiramate (TOPAMAX) 50 mg tablet Take 50 mg by mouth two (2) times a day. (Patient not taking: Reported on 6/17/2021)       No current facility-administered medications on file prior to visit.      Allergies   Allergen Reactions    Calcium Iodide Unproven on Challenge     Pt. denies    Nickel Itching    Pneumovax 23 [Pneumococcal 23-Amita Ps Vaccine] Unknown (comments)    Shellfish Containing Products Swelling     Family History   Problem Relation Age of Onset    Diabetes Mother     Heart Surgery Father     Cancer Father     Hypertension Sister     Diabetes Brother     High Cholesterol Sister     Diabetes Sister     Breast Cancer Sister     HIV/AIDS Brother     Breast Cancer Other     Ovarian Cancer Maternal Aunt      Social History     Socioeconomic History    Marital status:      Spouse name: Not on file    Number of children: Not on file    Years of education: Not on file    Highest education level: Not on file   Occupational History    Not on file   Tobacco Use    Smoking status: Former Smoker     Packs/day: 1.00     Years: 17.00     Pack years: 17.00     Types: Cigarettes     Quit date: 12     Years since quittin.9    Smokeless tobacco: Never Used    Tobacco comment: Quit in , smoked 1 pack a week   Vaping Use    Vaping Use: Never used   Substance and Sexual Activity    Alcohol use: No    Drug use: No    Sexual activity: Not Currently   Other Topics Concern    Not on file   Social History Narrative    Not on file     Social Determinants of Health     Financial Resource Strain:     Difficulty of Paying Living Expenses: Not on file   Food Insecurity:     Worried About [address] in the Last Year: Not on file    [address] [employer] in the Last Year: Not on file   Transportation Needs:     Lack of Transportation (Medical): Not on file    Lack of Transportation (Non-Medical):  Not on file   Physical Activity:     Days of Exercise per Week: Not on file    Minutes of Exercise per Session: Not on file   Stress:     Feeling of Stress : Not on file   Social Connections:     Frequency of Communication with Friends and Family: Not on file    Frequency of Social Gatherings with Friends and Family: Not on file    Attends [employer] Services: Not on file    Active Member of [address] [place] [address] or Organizations: Not on file    Attends Club or Organization Meetings: Not on file    Marital Status: Not on file   Intimate Partner Violence:     Fear of Current or Ex-Partner: Not on file    Emotionally Abused: Not on file    Physically Abused: Not on file    Sexually Abused: Not on file   Housing Stability:     Unable to Pay for Housing in the Last Year: Not on file    Number of [place] in the Last Year: Not on file    Unstable Housing in the Last Year: Not on file     Blood pressure (!) 154/79, pulse 88, temperature 97.4 °F (36.3 °C), temperature source Temporal, resp. rate 18, height 5' 7\" (1.702 m), weight 109.3 kg (241 lb), SpO2 98 %. Physical Exam  Constitutional:       General: She is not in acute distress. Appearance: She is obese. She is not ill-appearing, toxic-appearing or diaphoretic. HENT:      Head: Normocephalic and atraumatic. Right Ear: External ear normal.      Left Ear: External ear normal.      Nose:      Comments: Deferred      Mouth/Throat:      Comments: Deferred   Eyes:      General:         Right eye: No discharge. Left eye: No discharge. Extraocular Movements: Extraocular movements intact. Conjunctiva/sclera: Conjunctivae normal.      Pupils: Pupils are equal, round, and reactive to light. Neck:      Vascular: No carotid bruit. Cardiovascular:      Rate and Rhythm: Normal rate and regular rhythm. Pulses: Normal pulses. Heart sounds: Murmur (3/6 systolic murmur unchanged) heard. No gallop. Pulmonary:      Effort: Pulmonary effort is normal. No respiratory distress. Breath sounds: Normal breath sounds. No stridor. No wheezing, rhonchi or rales. Chest:      Chest wall: No tenderness. Abdominal:      Palpations: Abdomen is soft. There is no mass. Tenderness: There is no abdominal tenderness. There is no rebound. Musculoskeletal:         General: No swelling, tenderness, deformity or signs of injury. Cervical back: No rigidity. No muscular tenderness. Right lower leg: No edema. Left lower leg: No edema. Lymphadenopathy:      Cervical: No cervical adenopathy. Skin:     General: Skin is warm and dry. Coloration: Skin is not jaundiced or pale. Findings: No bruising, erythema or lesion. Neurological:      General: No focal deficit present. Mental Status: She is alert and oriented to person, place, and time.       Coordination: Coordination normal.   Psychiatric:         Mood and Affect: Mood normal.         Behavior: Behavior normal.         Thought Content: Thought content normal.         Judgment: Judgment normal.       XR Results (most recent):  Results from Hospital Encounter encounter on 08/03/21    XR CHEST PA LAT    Narrative  EXAM: Chest Radiographs    INDICATION:  Preoperative chest x-ray    TECHNIQUE: PA and lateral views of the chest    COMPARISON: 4/5/2016 and 2/27/2013    FINDINGS: No pneumothorax identified. The lungs are clear. No infiltrates  identified. No effusions appreciated. The cardiomediastinal silhouette is  unremarkable. The pulmonary vascularity is unremarkable. The osseous structures  are unremarkable. Impression  1. No acute cardiopulmonary process. ASSESSMENT and PLAN  Encounter Diagnoses   Name Primary?  Mild intermittent asthma with acute exacerbation Yes    Obesity (BMI 30-39. 5)     Former smoker      Pt with asthma in exacerbation, resolved, and likely triggered by an upper respiratory viral infection. As pt is almost at baseline, would hold off on systemic steroids as risks of steroids outweigh potential benefits. Continue Breo and prn Albuterol   Pt to call if symptoms of exacerbation persist.  Counseled on trigger avoidance  Healthy weight discussion.   RTC 6 months  Shorty Bowser on return visit

## 2022-03-15 ENCOUNTER — TELEPHONE (OUTPATIENT)
Dept: PULMONOLOGY | Age: 67
End: 2022-03-15

## 2022-03-15 RX ORDER — AZITHROMYCIN 250 MG/1
TABLET, FILM COATED ORAL
Qty: 6 TABLET | Refills: 0 | Status: SHIPPED | OUTPATIENT
Start: 2022-03-15

## 2022-03-15 NOTE — TELEPHONE ENCOUNTER
Patient started with cough, congestion with yellowish/brown thick sputum 3 days ago. No fever. Patient requesting antibiotic for sxs.    [facility]

## 2022-03-15 NOTE — TELEPHONE ENCOUNTER
Pt called stating that her asthma was acting up and asked for an antibiotic to be called into [facility]. She stated that she has been coughing up think brown mucus.      Please call pt [phone]

## 2022-03-18 PROBLEM — G43.909 MIGRAINES: Status: ACTIVE | Noted: 2017-07-31

## 2022-03-18 PROBLEM — K21.9 ESOPHAGEAL REFLUX: Status: ACTIVE | Noted: 2017-07-31

## 2022-03-18 PROBLEM — M51.26 HNP (HERNIATED NUCLEUS PULPOSUS), LUMBAR: Status: ACTIVE | Noted: 2018-02-15

## 2022-03-18 PROBLEM — F32.A ANXIETY AND DEPRESSION: Status: ACTIVE | Noted: 2018-02-15

## 2022-03-18 PROBLEM — F41.9 ANXIETY AND DEPRESSION: Status: ACTIVE | Noted: 2018-02-15

## 2022-03-19 PROBLEM — I10 ESSENTIAL HYPERTENSION: Status: ACTIVE | Noted: 2017-07-31

## 2022-03-19 PROBLEM — Z98.1 S/P LUMBAR FUSION: Status: ACTIVE | Noted: 2018-02-16

## 2022-03-19 PROBLEM — M81.0 OSTEOPOROSIS: Status: ACTIVE | Noted: 2017-07-31

## 2022-03-20 PROBLEM — E11.9 TYPE 2 DIABETES MELLITUS WITHOUT COMPLICATION, WITHOUT LONG-TERM CURRENT USE OF INSULIN: Status: ACTIVE | Noted: 2018-02-15

## 2022-07-14 ENCOUNTER — OFFICE VISIT (OUTPATIENT)
Dept: PULMONOLOGY | Age: 67
End: 2022-07-14
Payer: MEDICARE

## 2022-07-14 VITALS — HEIGHT: 67 IN | WEIGHT: 220 LBS | BODY MASS INDEX: 34.53 KG/M2

## 2022-07-14 DIAGNOSIS — J45.30 MILD PERSISTENT REACTIVE AIRWAY DISEASE WITHOUT COMPLICATION: Primary | ICD-10-CM

## 2022-07-14 DIAGNOSIS — J45.21 MILD INTERMITTENT ASTHMA WITH ACUTE EXACERBATION: ICD-10-CM

## 2022-07-14 PROCEDURE — 94010 BREATHING CAPACITY TEST: CPT | Performed by: INTERNAL MEDICINE

## 2022-07-27 ENCOUNTER — OFFICE VISIT (OUTPATIENT)
Dept: PULMONOLOGY | Age: 67
End: 2022-07-27
Payer: MEDICARE

## 2022-07-27 VITALS
RESPIRATION RATE: 18 BRPM | TEMPERATURE: 97.5 F | HEART RATE: 86 BPM | SYSTOLIC BLOOD PRESSURE: 122 MMHG | DIASTOLIC BLOOD PRESSURE: 74 MMHG | OXYGEN SATURATION: 97 % | WEIGHT: 217.8 LBS | BODY MASS INDEX: 34.18 KG/M2 | HEIGHT: 67 IN

## 2022-07-27 DIAGNOSIS — J45.30 MILD PERSISTENT ALLERGIC ASTHMA WITHOUT COMPLICATION: Primary | ICD-10-CM

## 2022-07-27 DIAGNOSIS — R05.8 UPPER AIRWAY COUGH SYNDROME: ICD-10-CM

## 2022-07-27 DIAGNOSIS — E66.9 OBESITY (BMI 30.0-34.9): ICD-10-CM

## 2022-07-27 PROCEDURE — 1090F PRES/ABSN URINE INCON ASSESS: CPT | Performed by: INTERNAL MEDICINE

## 2022-07-27 PROCEDURE — G8752 SYS BP LESS 140: HCPCS | Performed by: INTERNAL MEDICINE

## 2022-07-27 PROCEDURE — 1101F PT FALLS ASSESS-DOCD LE1/YR: CPT | Performed by: INTERNAL MEDICINE

## 2022-07-27 PROCEDURE — G8417 CALC BMI ABV UP PARAM F/U: HCPCS | Performed by: INTERNAL MEDICINE

## 2022-07-27 PROCEDURE — G9717 DOC PT DX DEP/BP F/U NT REQ: HCPCS | Performed by: INTERNAL MEDICINE

## 2022-07-27 PROCEDURE — 1123F ACP DISCUSS/DSCN MKR DOCD: CPT | Performed by: INTERNAL MEDICINE

## 2022-07-27 PROCEDURE — 99213 OFFICE O/P EST LOW 20 MIN: CPT | Performed by: INTERNAL MEDICINE

## 2022-07-27 PROCEDURE — G8536 NO DOC ELDER MAL SCRN: HCPCS | Performed by: INTERNAL MEDICINE

## 2022-07-27 PROCEDURE — 3017F COLORECTAL CA SCREEN DOC REV: CPT | Performed by: INTERNAL MEDICINE

## 2022-07-27 PROCEDURE — G8754 DIAS BP LESS 90: HCPCS | Performed by: INTERNAL MEDICINE

## 2022-07-27 PROCEDURE — G8427 DOCREV CUR MEDS BY ELIG CLIN: HCPCS | Performed by: INTERNAL MEDICINE

## 2022-07-27 NOTE — PROGRESS NOTES
HISTORY OF PRESENT ILLNESS  Dinora Bangura is a 79 y.o. female following up for Asthma  Pt with a long history of Asthma, previously well controlled until March 2020 when pt complained of sudden on SOB and wheezing, diagnosed with Asthma exacerbation in the ED and discharged home after nebulized brohcodilators and Prednisone taper. Pt was started on Pulmicort with partial response and eventually controlled on Breo until lately. Pt notes increased cough, SOB and wheezing x 2 weeks associated with rhinorrhea and post nasal drip. She denies fever, chest pain. Symptoms did not require treatment in ED or hospital. She is on Azelastine and nasal saline flushes. Pt is an ex smoker who quit in the 90's. She works as a school nurse. Review of Systems   Constitutional:  Negative for chills, diaphoresis, fever, malaise/fatigue and weight loss. HENT:  Positive for congestion. Negative for ear discharge, ear pain, hearing loss, nosebleeds, sinus pain, sore throat and tinnitus. Eyes:  Negative for blurred vision, double vision, photophobia, pain, discharge and redness. Respiratory:  Positive for cough, shortness of breath and wheezing. Negative for hemoptysis, sputum production and stridor. Cardiovascular:  Negative for chest pain, palpitations, orthopnea, claudication, leg swelling and PND. Gastrointestinal:  Negative for abdominal pain, blood in stool, constipation, diarrhea, heartburn, melena, nausea and vomiting. Genitourinary:  Negative for dysuria, flank pain, frequency, hematuria and urgency. Musculoskeletal:  Negative for back pain, falls, joint pain, myalgias and neck pain. Skin:  Negative for itching and rash. Neurological:  Positive for headaches. Negative for dizziness, tingling, tremors, sensory change, speech change, focal weakness, seizures, loss of consciousness and weakness. Endo/Heme/Allergies:  Negative for environmental allergies and polydipsia. Does not bruise/bleed easily. Psychiatric/Behavioral:  Negative for depression, hallucinations, memory loss, substance abuse and suicidal ideas. The patient is not nervous/anxious and does not have insomnia. Past Medical History:   Diagnosis Date    Anemia     Arthritis     knees back     Arthritis     Bronchitis     Chest tightness     Chronic fatigue syndrome     Chronic pain     left knee    Colon polyps     Depression     Diabetes mellitus (HCC)     Elevated cholesterol     Heart disease     Heart murmur     Heart murmur     History of echocardiogram 04/17/2012    EF 60-65%. Mild conc LVH. RVSP 15-20. No significant valvular heart disease. Hypercholesterolemia     Left knee pain     Lumbar radicular pain     Microscopic hematuria     Migraine headache     Milk intolerance     Palpitations     Renal cyst     YEE (stress urinary incontinence), male     Swelling     Syncope     Vitamin D deficiency      Past Surgical History:   Procedure Laterality Date    HX COLONOSCOPY  2/18/2014    Polyp (5mm) in the proximal sigmoid colon. (polypectomy)    HX COLONOSCOPY  04/07/2009    HX COLONOSCOPY  4/10/2006    HX HYSTERECTOMY  1989    HX KNEE ARTHROSCOPY      HX KNEE ARTHROSCOPY      HX KNEE REPLACEMENT  03/2013    left knee replacement    HX LUMBAR FUSION      L5-S1    HX MENISCECTOMY      HX ORTHOPAEDIC      neck fusion  c4 c5    HX ORTHOPAEDIC      arthroscopy left knee    HX OTHER SURGICAL  2009    Colon polyp removed     Current Outpatient Medications on File Prior to Visit   Medication Sig Dispense Refill    azithromycin (ZITHROMAX) 250 mg tablet Take 2 tabs po on day one then 1 tab po daily 6 Tablet 0    amLODIPine (NORVASC) 2.5 mg tablet Take  by mouth daily. ascorbic acid, vitamin C, (VITAMIN C) 250 mg tablet Take  by mouth. azelastine (ASTELIN) 137 mcg (0.1 %) nasal spray 1 Spray two (2) times a day. Use in each nostril as directed      ipratropium (ATROVENT) 21 mcg (0.03 %) nasal spray 2 Sprays every twelve (12) hours. montelukast (SINGULAIR) 10 mg tablet Take 10 mg by mouth daily. fluticasone furoate-vilanteroL (Breo Ellipta) 100-25 mcg/dose inhaler USE 1 INHALATION BY MOUTH  DAILY 3 Inhaler 3    amLODIPine (NORVASC) 5 mg tablet TAKE 1 TABLET DAILY      venlafaxine-SR (EFFEXOR-XR) 75 mg capsule Take 37.5 mg by mouth daily. ascorbic acid, vitamin C, (VITAMIN C) 500 mg tablet Take 500 mg by mouth daily. albuterol (PROVENTIL HFA, VENTOLIN HFA, PROAIR HFA) 90 mcg/actuation inhaler Take 2 Puffs by inhalation every six (6) hours as needed. fluticasone propionate (FLONASE) 50 mcg/actuation nasal spray 2 Sprays by Both Nostrils route daily. glimepiride (AMARYL) 2 mg tablet Take  by mouth every morning. ibuprofen (MOTRIN) 800 mg tablet Take 800 mg by mouth every eight (8) hours as needed. aspirin 81 mg chewable tablet Take 81 mg by mouth daily. vitamin e (E GEMS) 1,000 unit capsule Take 1,000 Units by mouth daily. esomeprazole (NEXIUM) 40 mg capsule Take 40 mg by mouth daily. cholecalciferol, vitamin D3, 2,000 unit tab Take 1 Tab by mouth daily. atorvastatin (LIPITOR) 40 mg tablet Take 40 mg by mouth daily. losartan (COZAAR) 100 mg tablet Take 100 mg by mouth daily. glucose blood VI test strips (Precision Xtra Test) strip 100 Each daily. (Patient not taking: No sig reported)      Blood-Glucose Meter monitoring kit E11.9 test once a day (Patient not taking: No sig reported)      lancets 28 gauge misc 1 Units daily. (Patient not taking: No sig reported)      mirabegron ER (MYRBETRIQ) 50 mg ER tablet Take 1 Tab by mouth daily. Indications: Urine Leakage When there is a Strong Desire to Void (Patient not taking: No sig reported) 90 Tab 3    gabapentin (NEURONTIN) 300 mg capsule Take 2 Caps by mouth three (3) times daily.  (Patient not taking: No sig reported) 180 Cap 2    oxyCODONE-acetaminophen (PERCOCET) 5-325 mg per tablet Take 1 tablet every 4-6 hours as needed for pain control. If you were instructed to try over the counter ibuprofen or tylenol, only take the percocet for pain not controlled with the over the counter medication. (Patient not taking: No sig reported) 12 Tab 0    cyclobenzaprine (FLEXERIL) 10 mg tablet Take 1 Tab by mouth three (3) times daily as needed for Muscle Spasm(s). (Patient not taking: No sig reported) 15 Tab 0    cyanocobalamin (VITAMIN B12) 500 mcg tablet Take 500 mcg by mouth daily. (Patient not taking: No sig reported)      topiramate (TOPAMAX) 50 mg tablet Take 50 mg by mouth two (2) times a day. (Patient not taking: No sig reported)       No current facility-administered medications on file prior to visit.      Allergies   Allergen Reactions    Calcium Iodide Unproven on Challenge     Pt. denies    Nickel Itching    Pneumovax 23 [Pneumococcal 23-Amita Ps Vaccine] Unknown (comments)    Shellfish Containing Products Swelling     Family History   Problem Relation Age of Onset    Diabetes Mother     Heart Surgery Father     Cancer Father     Hypertension Sister     Diabetes Brother     High Cholesterol Sister     Diabetes Sister     Breast Cancer Sister     HIV/AIDS Brother     Breast Cancer Other     Ovarian Cancer Maternal Aunt      Social History     Socioeconomic History    Marital status:      Spouse name: Not on file    Number of children: Not on file    Years of education: Not on file    Highest education level: Not on file   Occupational History    Not on file   Tobacco Use    Smoking status: Former     Packs/day: 0.25     Years: 17.00     Pack years: 4.25     Types: Cigarettes     Quit date:      Years since quittin.5    Smokeless tobacco: Never    Tobacco comments:     Quit in , smoked 1 pack a week   Vaping Use    Vaping Use: Never used   Substance and Sexual Activity    Alcohol use: No    Drug use: No    Sexual activity: Not Currently   Other Topics Concern    Not on file   Social History Narrative    Not on file     Social Determinants of Health     Financial Resource Strain: Not on file   Food Insecurity: Not on file   Transportation Needs: Not on file   Physical Activity: Not on file   Stress: Not on file   Social Connections: Not on file   Intimate Partner Violence: Not on file   Housing Stability: Not on file     Blood pressure 122/74, pulse 86, temperature 97.5 °F (36.4 °C), temperature source Temporal, resp. rate 18, height 5' 7\" (1.702 m), weight 98.8 kg (217 lb 12.8 oz), SpO2 97 %. Physical Exam  Constitutional:       General: She is not in acute distress. Appearance: She is obese. She is not ill-appearing, toxic-appearing or diaphoretic. HENT:      Head: Normocephalic and atraumatic. Right Ear: External ear normal.      Left Ear: External ear normal.      Nose:      Comments: Deferred      Mouth/Throat:      Comments: Deferred   Eyes:      General:         Right eye: No discharge. Left eye: No discharge. Extraocular Movements: Extraocular movements intact. Conjunctiva/sclera: Conjunctivae normal.      Pupils: Pupils are equal, round, and reactive to light. Neck:      Vascular: No carotid bruit. Cardiovascular:      Rate and Rhythm: Normal rate and regular rhythm. Pulses: Normal pulses. Heart sounds: Murmur (3/6 systolic murmur unchanged) heard. No gallop. Pulmonary:      Effort: Pulmonary effort is normal. No respiratory distress. Breath sounds: Normal breath sounds. No stridor. No wheezing, rhonchi or rales. Chest:      Chest wall: No tenderness. Abdominal:      Palpations: Abdomen is soft. There is no mass. Tenderness: There is no abdominal tenderness. There is no rebound. Musculoskeletal:         General: Signs of injury (R ankle with swelling and mild hyperpigmentation) present. No tenderness or deformity. Cervical back: No rigidity. No muscular tenderness. Right lower leg: No edema. Left lower leg: No edema.    Lymphadenopathy: Cervical: No cervical adenopathy. Skin:     General: Skin is warm and dry. Coloration: Skin is not jaundiced or pale. Findings: No bruising, erythema, lesion or rash. Neurological:      General: No focal deficit present. Mental Status: She is alert and oriented to person, place, and time. Coordination: Coordination normal.   Psychiatric:         Mood and Affect: Mood normal.         Behavior: Behavior normal.         Thought Content: Thought content normal.         Judgment: Judgment normal.     XR Results (most recent):  Results from Hospital Encounter encounter on 08/03/21    XR CHEST PA LAT    Narrative  EXAM: Chest Radiographs    INDICATION:  Preoperative chest x-ray    TECHNIQUE: PA and lateral views of the chest    COMPARISON: 4/5/2016 and 2/27/2013    FINDINGS: No pneumothorax identified. The lungs are clear. No infiltrates  identified. No effusions appreciated. The cardiomediastinal silhouette is  unremarkable. The pulmonary vascularity is unremarkable. The osseous structures  are unremarkable. Impression  1. No acute cardiopulmonary process. Spirometry: normal FEV1 and FEV1/FVC ratio    ASSESSMENT and PLAN  Encounter Diagnoses   Name Primary? Mild persistent allergic asthma without complication Yes    Upper airway cough syndrome     Obesity (BMI 30.0-34. 9)      Pt with asthma not in exacerbation. Increased SOB and wheezing probably related to allergies. Pt to continue medications as listed, but also try OTC benadryl HS prn for UACS. Pt cautioned on Benadryl induced drowsiness. Counseled on trigger avoidance  Healthy weight discussion. RTC 6 months  Reviewed joel results with pt.

## 2022-07-27 NOTE — PROGRESS NOTES
Radha Carbajal presents today for   Chief Complaint   Patient presents with    Follow-up       Is someone accompanying this pt? no    Is the patient using any DME equipment during OV? no    -DME Company n/a    Depression Screening:  3 most recent PHQ Screens 7/27/2022   Little interest or pleasure in doing things Not at all   Feeling down, depressed, irritable, or hopeless Not at all   Total Score PHQ 2 0       Learning Assessment:  Learning Assessment 1/7/2021   PRIMARY LEARNER Patient   PRIMARY LANGUAGE ENGLISH   LEARNER PREFERENCE PRIMARY DEMONSTRATION     READING     LISTENING     PICTURES     VIDEOS   ANSWERED BY Patient   RELATIONSHIP SELF       Abuse Screening:  Abuse Screening Questionnaire 1/7/2021   Do you ever feel afraid of your partner? N   Are you in a relationship with someone who physically or mentally threatens you? N   Is it safe for you to go home? Y       Fall Risk  Fall Risk Assessment, last 12 mths 6/17/2021   Able to walk? Yes   Fall in past 12 months? 0   Do you feel unsteady? 0   Are you worried about falling 0         Coordination of Care:  1. Have you been to the ER, urgent care clinic since your last visit? Hospitalized since your last visit? No    2. Have you seen or consulted any other health care providers outside of the [address] [place] [address] since your last visit? Include any pap smears or colon screening.  No

## 2022-07-29 ENCOUNTER — TELEPHONE (OUTPATIENT)
Dept: PULMONOLOGY | Age: 67
End: 2022-07-29

## 2022-07-29 RX ORDER — FLUTICASONE FUROATE AND VILANTEROL 100; 25 UG/1; UG/1
POWDER RESPIRATORY (INHALATION)
Qty: 1 EACH | Refills: 5 | Status: SHIPPED | OUTPATIENT
Start: 2022-07-29

## 2025-04-16 ENCOUNTER — HOSPITAL ENCOUNTER (OUTPATIENT)
Facility: HOSPITAL | Age: 70
Discharge: HOME | End: 2025-04-19
Payer: MEDICARE

## 2025-04-16 ENCOUNTER — TRANSCRIBE ORDERS (OUTPATIENT)
Facility: HOSPITAL | Age: 70
End: 2025-04-16

## 2025-04-16 DIAGNOSIS — M25.572 LEFT ANKLE PAIN, UNSPECIFIED CHRONICITY: Primary | ICD-10-CM

## 2025-04-16 DIAGNOSIS — M25.572 LEFT ANKLE PAIN, UNSPECIFIED CHRONICITY: ICD-10-CM

## 2025-04-16 PROCEDURE — 73600 X-RAY EXAM OF ANKLE: CPT

## 2025-05-13 ENCOUNTER — OFFICE VISIT (OUTPATIENT)
Age: 70
End: 2025-05-13
Payer: MEDICARE

## 2025-05-13 DIAGNOSIS — M76.822 POSTERIOR TIBIAL TENDINITIS OF LEFT LOWER EXTREMITY: Primary | ICD-10-CM

## 2025-05-13 DIAGNOSIS — M19.072 PRIMARY OSTEOARTHRITIS OF LEFT FOOT: ICD-10-CM

## 2025-05-13 PROCEDURE — 1123F ACP DISCUSS/DSCN MKR DOCD: CPT | Performed by: ORTHOPAEDIC SURGERY

## 2025-05-13 PROCEDURE — 1159F MED LIST DOCD IN RCRD: CPT | Performed by: ORTHOPAEDIC SURGERY

## 2025-05-13 PROCEDURE — 1125F AMNT PAIN NOTED PAIN PRSNT: CPT | Performed by: ORTHOPAEDIC SURGERY

## 2025-05-13 PROCEDURE — 1160F RVW MEDS BY RX/DR IN RCRD: CPT | Performed by: ORTHOPAEDIC SURGERY

## 2025-05-13 PROCEDURE — 99203 OFFICE O/P NEW LOW 30 MIN: CPT | Performed by: ORTHOPAEDIC SURGERY

## 2025-05-13 NOTE — PROGRESS NOTES
AMBULATORY PROGRESS NOTE      Patient: Lavon Oneal             MRN: [id number]     SSN: [id number] There is no height or weight on file to calculate BMI.  YOB: 1955     AGE: 69 y.o.       EX: female    PCP: Maricruz Olea MD       IMPRESSION //  DIAGNOSIS AND TREATMENT PLAN      Lavon Oneal has a diagnosis of:      DIAGNOSES    1. Posterior tibial tendinitis of left lower extremity    2. Primary osteoarthritis of left foot          PLAN:    1. Left Ankle MRI WO Contrast to assess deltoid complex and posterior tibial tendon  2. Continue wearing PowerStep inserts and Aircast brace as tolerated. Discussed custom left SMO brace as alternative.       RTO after MRI     Orders Placed This Encounter    MRI ANKLE LEFT WO CONTRAST     Standing Status:   Future     Expected Date:   5/13/2025     Expiration Date:   5/13/2026     Reason for exam::   assess deltoid complex and posterior tibial tendon     What is the sedation requirement?:   None        Patient Instructions   If we order a Diagnostic test (such as MRI or CT) during your office visit please see below:     Coordination of Care will be calling you to schedule your diagnostic test. If you have not heard from Coordination of Care within 2 business days, please call [phone].     Once you have a date scheduled for your diagnostic test, you will need to contact our office to schedule a follow up appointment about 4 days following the exam, as this is when the physician will review your diagnostic test results with you. You can contact our office to schedule appointment by phone at [phone], or you can send a message via [facility] to request an appointment.         Please follow up with your PCP for any health maintenance as recommended.         Lavon Oneal  expresses understanding of the diagnosis, treatment plan, and all of their proposed questions were answered to their satisfaction. Patient education has been provided re

## 2025-05-13 NOTE — PATIENT INSTRUCTIONS
If we order a Diagnostic test (such as MRI or CT) during your office visit please see below:     Coordination of Care will be calling you to schedule your diagnostic test. If you have not heard from Coordination of Care within 2 business days, please call [phone].     Once you have a date scheduled for your diagnostic test, you will need to contact our office to schedule a follow up appointment about 4 days following the exam, as this is when the physician will review your diagnostic test results with you. You can contact our office to schedule appointment by phone at [phone], or you can send a message via [facility] to request an appointment.

## 2025-06-17 ENCOUNTER — HOSPITAL ENCOUNTER (OUTPATIENT)
Facility: HOSPITAL | Age: 70
Discharge: HOME | End: 2025-06-20
Attending: ORTHOPAEDIC SURGERY
Payer: MEDICARE

## 2025-06-17 DIAGNOSIS — M76.822 POSTERIOR TIBIAL TENDINITIS OF LEFT LOWER EXTREMITY: ICD-10-CM

## 2025-06-17 PROCEDURE — 73721 MRI JNT OF LWR EXTRE W/O DYE: CPT

## 2025-07-23 ENCOUNTER — TRANSCRIBE ORDERS (OUTPATIENT)
Facility: HOSPITAL | Age: 70
End: 2025-07-23

## 2025-07-23 DIAGNOSIS — N64.4 BREAST PAIN, LEFT: Primary | ICD-10-CM

## 2025-07-24 ENCOUNTER — HOSPITAL ENCOUNTER (OUTPATIENT)
Facility: HOSPITAL | Age: 70
Discharge: HOME | End: 2025-07-27
Attending: INTERNAL MEDICINE
Payer: MEDICARE

## 2025-07-24 VITALS — BODY MASS INDEX: 33.74 KG/M2 | HEIGHT: 67 IN | WEIGHT: 215 LBS

## 2025-07-24 DIAGNOSIS — N64.4 BREAST PAIN, LEFT: ICD-10-CM

## 2025-07-24 DIAGNOSIS — S20.212D CONTUSION OF CHEST, LEFT, SUBSEQUENT ENCOUNTER: ICD-10-CM

## 2025-07-24 PROCEDURE — G0279 TOMOSYNTHESIS, MAMMO: HCPCS

## 2025-07-24 PROCEDURE — 76642 ULTRASOUND BREAST LIMITED: CPT
